# Patient Record
Sex: FEMALE | Race: WHITE | NOT HISPANIC OR LATINO | Employment: UNEMPLOYED | URBAN - NONMETROPOLITAN AREA
[De-identification: names, ages, dates, MRNs, and addresses within clinical notes are randomized per-mention and may not be internally consistent; named-entity substitution may affect disease eponyms.]

---

## 2020-01-16 ENCOUNTER — APPOINTMENT (OUTPATIENT)
Dept: GENERAL RADIOLOGY | Facility: HOSPITAL | Age: 50
End: 2020-01-16

## 2020-01-16 ENCOUNTER — HOSPITAL ENCOUNTER (EMERGENCY)
Facility: HOSPITAL | Age: 50
Discharge: HOME | End: 2020-01-16
Attending: EMERGENCY MEDICINE | Admitting: EMERGENCY MEDICINE

## 2020-01-16 VITALS
WEIGHT: 250 LBS | OXYGEN SATURATION: 93 % | BODY MASS INDEX: 46.01 KG/M2 | RESPIRATION RATE: 20 BRPM | HEIGHT: 62 IN | TEMPERATURE: 98.1 F | SYSTOLIC BLOOD PRESSURE: 180 MMHG | DIASTOLIC BLOOD PRESSURE: 100 MMHG | HEART RATE: 87 BPM

## 2020-01-16 DIAGNOSIS — R06.00 DYSPNEA, UNSPECIFIED TYPE: Primary | ICD-10-CM

## 2020-01-16 LAB
A-A DO2: 44.9 MMHG (ref 0–300)
ALBUMIN SERPL-MCNC: 4.27 G/DL (ref 3.5–5.2)
ALBUMIN/GLOB SERPL: 1.3 G/DL
ALP SERPL-CCNC: 89 U/L (ref 39–117)
ALT SERPL W P-5'-P-CCNC: 10 U/L (ref 1–33)
ANION GAP SERPL CALCULATED.3IONS-SCNC: 11.6 MMOL/L (ref 5–15)
ARTERIAL PATENCY WRIST A: ABNORMAL
AST SERPL-CCNC: 22 U/L (ref 1–32)
ATMOSPHERIC PRESS: 740 MMHG
BASE EXCESS BLDA CALC-SCNC: 0.2 MMOL/L (ref 0–2)
BASOPHILS # BLD AUTO: 0.09 10*3/MM3 (ref 0–0.2)
BASOPHILS NFR BLD AUTO: 1 % (ref 0–1.5)
BDY SITE: ABNORMAL
BILIRUB SERPL-MCNC: 0.3 MG/DL (ref 0.2–1.2)
BODY TEMPERATURE: 0 C
BUN BLD-MCNC: 9 MG/DL (ref 6–20)
BUN/CREAT SERPL: 12.5 (ref 7–25)
CALCIUM SPEC-SCNC: 9.4 MG/DL (ref 8.6–10.5)
CHLORIDE SERPL-SCNC: 103 MMOL/L (ref 98–107)
CO2 BLDA-SCNC: 24 MMOL/L (ref 22–33)
CO2 SERPL-SCNC: 24.4 MMOL/L (ref 22–29)
COHGB MFR BLD: 0.7 % (ref 0–5)
CREAT BLD-MCNC: 0.72 MG/DL (ref 0.57–1)
CRP SERPL-MCNC: 0.2 MG/DL (ref 0–0.5)
D DIMER PPP FEU-MCNC: 0.39 MCGFEU/ML (ref 0–0.5)
D-LACTATE SERPL-SCNC: 1.1 MMOL/L (ref 0.5–2)
DEPRECATED RDW RBC AUTO: 46.8 FL (ref 37–54)
EOSINOPHIL # BLD AUTO: 0.95 10*3/MM3 (ref 0–0.4)
EOSINOPHIL NFR BLD AUTO: 10 % (ref 0.3–6.2)
ERYTHROCYTE [DISTWIDTH] IN BLOOD BY AUTOMATED COUNT: 14.4 % (ref 12.3–15.4)
FLUAV AG NPH QL: NEGATIVE
FLUBV AG NPH QL IA: NEGATIVE
GFR SERPL CREATININE-BSD FRML MDRD: 85 ML/MIN/1.73
GLOBULIN UR ELPH-MCNC: 3.2 GM/DL
GLUCOSE BLD-MCNC: 109 MG/DL (ref 65–99)
HCO3 BLDA-SCNC: 23 MMOL/L (ref 20–26)
HCT VFR BLD AUTO: 42.4 % (ref 34–46.6)
HCT VFR BLD CALC: 43.3 % (ref 38–51)
HGB BLD-MCNC: 13.6 G/DL (ref 12–15.9)
HGB BLDA-MCNC: 14.1 G/DL (ref 13.5–17.5)
HOROWITZ INDEX BLD+IHG-RTO: 21 %
IMM GRANULOCYTES # BLD AUTO: 0.05 10*3/MM3 (ref 0–0.05)
IMM GRANULOCYTES NFR BLD AUTO: 0.5 % (ref 0–0.5)
LYMPHOCYTES # BLD AUTO: 2.46 10*3/MM3 (ref 0.7–3.1)
LYMPHOCYTES NFR BLD AUTO: 26 % (ref 19.6–45.3)
Lab: ABNORMAL
MCH RBC QN AUTO: 28.6 PG (ref 26.6–33)
MCHC RBC AUTO-ENTMCNC: 32.1 G/DL (ref 31.5–35.7)
MCV RBC AUTO: 89.3 FL (ref 79–97)
METHGB BLD QL: 0.5 % (ref 0–3)
MODALITY: ABNORMAL
MONOCYTES # BLD AUTO: 0.69 10*3/MM3 (ref 0.1–0.9)
MONOCYTES NFR BLD AUTO: 7.3 % (ref 5–12)
NEUTROPHILS # BLD AUTO: 5.23 10*3/MM3 (ref 1.7–7)
NEUTROPHILS NFR BLD AUTO: 55.2 % (ref 42.7–76)
NOTE: ABNORMAL
NRBC BLD AUTO-RTO: 0 /100 WBC (ref 0–0.2)
OXYHGB MFR BLDV: 93.2 % (ref 94–99)
PCO2 BLDA: 31.5 MM HG (ref 35–45)
PCO2 TEMP ADJ BLD: ABNORMAL MM[HG]
PH BLDA: 7.47 PH UNITS (ref 7.35–7.45)
PH, TEMP CORRECTED: ABNORMAL
PLATELET # BLD AUTO: 390 10*3/MM3 (ref 140–450)
PMV BLD AUTO: 9.6 FL (ref 6–12)
PO2 BLDA: 65.2 MM HG (ref 83–108)
PO2 TEMP ADJ BLD: ABNORMAL MM[HG]
POTASSIUM BLD-SCNC: 4 MMOL/L (ref 3.5–5.2)
PROT SERPL-MCNC: 7.5 G/DL (ref 6–8.5)
RBC # BLD AUTO: 4.75 10*6/MM3 (ref 3.77–5.28)
SAO2 % BLDCOA: 94.3 % (ref 94–99)
SODIUM BLD-SCNC: 139 MMOL/L (ref 136–145)
TROPONIN T SERPL-MCNC: <0.01 NG/ML (ref 0–0.03)
VENTILATOR MODE: ABNORMAL
WBC NRBC COR # BLD: 9.47 10*3/MM3 (ref 3.4–10.8)

## 2020-01-16 PROCEDURE — 83050 HGB METHEMOGLOBIN QUAN: CPT

## 2020-01-16 PROCEDURE — 71045 X-RAY EXAM CHEST 1 VIEW: CPT | Performed by: RADIOLOGY

## 2020-01-16 PROCEDURE — 82805 BLOOD GASES W/O2 SATURATION: CPT

## 2020-01-16 PROCEDURE — 87804 INFLUENZA ASSAY W/OPTIC: CPT | Performed by: NURSE PRACTITIONER

## 2020-01-16 PROCEDURE — 84484 ASSAY OF TROPONIN QUANT: CPT | Performed by: NURSE PRACTITIONER

## 2020-01-16 PROCEDURE — 94799 UNLISTED PULMONARY SVC/PX: CPT

## 2020-01-16 PROCEDURE — 99284 EMERGENCY DEPT VISIT MOD MDM: CPT

## 2020-01-16 PROCEDURE — 25010000002 METHYLPREDNISOLONE PER 125 MG: Performed by: NURSE PRACTITIONER

## 2020-01-16 PROCEDURE — 96374 THER/PROPH/DIAG INJ IV PUSH: CPT

## 2020-01-16 PROCEDURE — 93005 ELECTROCARDIOGRAM TRACING: CPT | Performed by: NURSE PRACTITIONER

## 2020-01-16 PROCEDURE — 86140 C-REACTIVE PROTEIN: CPT | Performed by: NURSE PRACTITIONER

## 2020-01-16 PROCEDURE — 36600 WITHDRAWAL OF ARTERIAL BLOOD: CPT

## 2020-01-16 PROCEDURE — 80053 COMPREHEN METABOLIC PANEL: CPT | Performed by: NURSE PRACTITIONER

## 2020-01-16 PROCEDURE — 85379 FIBRIN DEGRADATION QUANT: CPT | Performed by: NURSE PRACTITIONER

## 2020-01-16 PROCEDURE — 94640 AIRWAY INHALATION TREATMENT: CPT

## 2020-01-16 PROCEDURE — 71045 X-RAY EXAM CHEST 1 VIEW: CPT

## 2020-01-16 PROCEDURE — 82375 ASSAY CARBOXYHB QUANT: CPT

## 2020-01-16 PROCEDURE — 87040 BLOOD CULTURE FOR BACTERIA: CPT | Performed by: NURSE PRACTITIONER

## 2020-01-16 PROCEDURE — 83605 ASSAY OF LACTIC ACID: CPT | Performed by: NURSE PRACTITIONER

## 2020-01-16 PROCEDURE — 93010 ELECTROCARDIOGRAM REPORT: CPT | Performed by: INTERNAL MEDICINE

## 2020-01-16 PROCEDURE — 85025 COMPLETE CBC W/AUTO DIFF WBC: CPT | Performed by: NURSE PRACTITIONER

## 2020-01-16 RX ORDER — METHYLPREDNISOLONE SODIUM SUCCINATE 125 MG/2ML
125 INJECTION, POWDER, LYOPHILIZED, FOR SOLUTION INTRAMUSCULAR; INTRAVENOUS ONCE
Status: COMPLETED | OUTPATIENT
Start: 2020-01-16 | End: 2020-01-16

## 2020-01-16 RX ORDER — CETIRIZINE HYDROCHLORIDE, PSEUDOEPHEDRINE HYDROCHLORIDE 5; 120 MG/1; MG/1
1 TABLET, FILM COATED, EXTENDED RELEASE ORAL DAILY
Status: ON HOLD | COMMUNITY
End: 2022-08-16

## 2020-01-16 RX ORDER — SODIUM CHLORIDE 0.9 % (FLUSH) 0.9 %
10 SYRINGE (ML) INJECTION AS NEEDED
Status: DISCONTINUED | OUTPATIENT
Start: 2020-01-16 | End: 2020-01-16 | Stop reason: HOSPADM

## 2020-01-16 RX ORDER — IPRATROPIUM BROMIDE AND ALBUTEROL SULFATE 2.5; .5 MG/3ML; MG/3ML
3 SOLUTION RESPIRATORY (INHALATION) ONCE
Status: COMPLETED | OUTPATIENT
Start: 2020-01-16 | End: 2020-01-16

## 2020-01-16 RX ORDER — FUROSEMIDE 40 MG/1
40 TABLET ORAL DAILY
Qty: 5 TABLET | Refills: 0 | Status: SHIPPED | OUTPATIENT
Start: 2020-01-16 | End: 2020-01-21

## 2020-01-16 RX ORDER — CLONIDINE HYDROCHLORIDE 0.1 MG/1
0.1 TABLET ORAL ONCE
Status: COMPLETED | OUTPATIENT
Start: 2020-01-16 | End: 2020-01-16

## 2020-01-16 RX ADMIN — CLONIDINE HYDROCHLORIDE 0.1 MG: 0.1 TABLET ORAL at 20:35

## 2020-01-16 RX ADMIN — SODIUM CHLORIDE, PRESERVATIVE FREE 10 ML: 5 INJECTION INTRAVENOUS at 18:43

## 2020-01-16 RX ADMIN — METHYLPREDNISOLONE SODIUM SUCCINATE 125 MG: 125 INJECTION, POWDER, FOR SOLUTION INTRAMUSCULAR; INTRAVENOUS at 18:43

## 2020-01-16 RX ADMIN — IPRATROPIUM BROMIDE AND ALBUTEROL SULFATE 3 ML: .5; 3 SOLUTION RESPIRATORY (INHALATION) at 18:12

## 2020-01-16 RX ADMIN — CLONIDINE HYDROCHLORIDE 0.1 MG: 0.1 TABLET ORAL at 18:45

## 2020-01-16 NOTE — ED PROVIDER NOTES
Subjective     History provided by:  Patient   used: No    Shortness of Breath   Severity:  Moderate  Onset quality:  Sudden  Duration:  1 day  Timing:  Constant  Progression:  Worsening  Chronicity:  Recurrent  Context: URI    Context: not animal exposure, not emotional upset, not occupational exposure, not pollens and not strong odors    Relieved by:  Nothing  Worsened by:  Nothing  Ineffective treatments:  None tried  Associated symptoms: sputum production and wheezing    Associated symptoms: no abdominal pain and no chest pain    Risk factors: no recent alcohol use, no family hx of DVT, no hx of PE/DVT, no obesity, no prolonged immobilization and no recent surgery        Review of Systems   Constitutional: Negative.    HENT: Negative.    Eyes: Negative.    Respiratory: Positive for sputum production, shortness of breath and wheezing.    Cardiovascular: Negative.  Negative for chest pain.   Gastrointestinal: Negative.  Negative for abdominal pain.   Endocrine: Negative.    Genitourinary: Negative.    Musculoskeletal: Negative.    Skin: Negative.    Allergic/Immunologic: Negative.    Neurological: Negative.    Hematological: Negative.    Psychiatric/Behavioral: Negative.        History reviewed. No pertinent past medical history.    No Known Allergies    History reviewed. No pertinent surgical history.    History reviewed. No pertinent family history.    Social History     Socioeconomic History   • Marital status:      Spouse name: Not on file   • Number of children: Not on file   • Years of education: Not on file   • Highest education level: Not on file   Tobacco Use   • Smoking status: Former Smoker     Last attempt to quit: 2019     Years since quittin.0   • Smokeless tobacco: Never Used   Substance and Sexual Activity   • Alcohol use: Not Currently   • Drug use: Never   • Sexual activity: Defer           Objective   Physical Exam   Constitutional: She is oriented to person,  place, and time. She appears well-developed and well-nourished.   HENT:   Head: Normocephalic.   Right Ear: External ear normal.   Left Ear: External ear normal.   Mouth/Throat: Oropharynx is clear and moist.   Eyes: Pupils are equal, round, and reactive to light. Conjunctivae and EOM are normal.   Neck: Normal range of motion. Neck supple.   Cardiovascular: Normal rate, regular rhythm, normal heart sounds and intact distal pulses.   Pulmonary/Chest: Effort normal. She has wheezes.   Abdominal: Soft. Bowel sounds are normal.   Musculoskeletal: Normal range of motion.   Neurological: She is alert and oriented to person, place, and time.   Skin: Skin is warm and dry. Capillary refill takes less than 2 seconds.   Psychiatric: She has a normal mood and affect. Her behavior is normal. Thought content normal.   Nursing note and vitals reviewed.      Procedures           ED Course  ED Course as of Jan 19 2340   Thu Jan 16, 2020 2001 Evaluated at bedside, and agree with assessment and plan.  Patient will follow-up with her primary care physician this coming Tuesday already scheduled.  Return to the emergency department with any worsening symptoms.  At bedside, she denies any shortness of breath.  SANDRA ROA    [ES]      ED Course User Index  [ES] Dg Charles MD                                               [facility]    Final diagnoses:   Dyspnea, unspecified type            Eduardo Kaur, APRN  01/19/20 1664

## 2020-01-17 NOTE — DISCHARGE INSTRUCTIONS
Follow up with primary care provider Tuesday as scheduled.     Return to the emergency room for worsening symptoms.

## 2020-01-21 LAB
BACTERIA SPEC AEROBE CULT: NORMAL
BACTERIA SPEC AEROBE CULT: NORMAL

## 2021-03-18 ENCOUNTER — BULK ORDERING (OUTPATIENT)
Dept: CASE MANAGEMENT | Facility: OTHER | Age: 51
End: 2021-03-18

## 2021-03-18 DIAGNOSIS — Z23 IMMUNIZATION DUE: ICD-10-CM

## 2021-10-25 ENCOUNTER — APPOINTMENT (OUTPATIENT)
Dept: BONE DENSITY | Facility: HOSPITAL | Age: 51
End: 2021-10-25

## 2022-03-23 ENCOUNTER — HOSPITAL ENCOUNTER (OUTPATIENT)
Dept: GENERAL RADIOLOGY | Facility: HOSPITAL | Age: 52
Discharge: HOME | End: 2022-03-23
Admitting: PHYSICIAN ASSISTANT

## 2022-03-23 ENCOUNTER — TRANSCRIBE ORDERS (OUTPATIENT)
Dept: OTHER | Facility: OTHER | Age: 52
End: 2022-03-23

## 2022-03-23 DIAGNOSIS — M25.569 KNEE PAIN, UNSPECIFIED CHRONICITY, UNSPECIFIED LATERALITY: Primary | ICD-10-CM

## 2022-03-23 DIAGNOSIS — M25.569 KNEE PAIN, UNSPECIFIED CHRONICITY, UNSPECIFIED LATERALITY: ICD-10-CM

## 2022-03-23 PROCEDURE — 73562 X-RAY EXAM OF KNEE 3: CPT

## 2022-03-23 PROCEDURE — 73562 X-RAY EXAM OF KNEE 3: CPT | Performed by: RADIOLOGY

## 2022-03-30 ENCOUNTER — TELEPHONE (OUTPATIENT)
Dept: ORTHOPEDIC SURGERY | Facility: CLINIC | Age: 52
End: 2022-03-30

## 2022-03-30 NOTE — TELEPHONE ENCOUNTER
Called to check on patient since she missed her appt today, home number asked for a remote access code and cell has a mailbox that hasn't been set up yet. Unable to leave message.

## 2022-08-15 ENCOUNTER — HOSPITAL ENCOUNTER (OUTPATIENT)
Facility: HOSPITAL | Age: 52
Setting detail: OBSERVATION
Discharge: HOME | End: 2022-08-18
Attending: EMERGENCY MEDICINE | Admitting: HOSPITALIST

## 2022-08-15 ENCOUNTER — APPOINTMENT (OUTPATIENT)
Dept: GENERAL RADIOLOGY | Facility: HOSPITAL | Age: 52
End: 2022-08-15

## 2022-08-15 DIAGNOSIS — R09.02 HYPOXIA: ICD-10-CM

## 2022-08-15 DIAGNOSIS — U07.1 COVID-19: Primary | ICD-10-CM

## 2022-08-15 LAB
A-A DO2: 91 MMHG (ref 0–300)
ALBUMIN SERPL-MCNC: 4.43 G/DL (ref 3.5–5.2)
ALBUMIN/GLOB SERPL: 1.7 G/DL
ALP SERPL-CCNC: 90 U/L (ref 39–117)
ALT SERPL W P-5'-P-CCNC: 19 U/L (ref 1–33)
ANION GAP SERPL CALCULATED.3IONS-SCNC: 12.2 MMOL/L (ref 5–15)
ARTERIAL PATENCY WRIST A: POSITIVE
AST SERPL-CCNC: 18 U/L (ref 1–32)
ATMOSPHERIC PRESS: 726 MMHG
BASE EXCESS BLDA CALC-SCNC: -0.6 MMOL/L (ref 0–2)
BASOPHILS # BLD AUTO: 0.04 10*3/MM3 (ref 0–0.2)
BASOPHILS NFR BLD AUTO: 0.7 % (ref 0–1.5)
BDY SITE: ABNORMAL
BILIRUB SERPL-MCNC: <0.2 MG/DL (ref 0–1.2)
BODY TEMPERATURE: 0 C
BUN SERPL-MCNC: 17 MG/DL (ref 6–20)
BUN/CREAT SERPL: 20.5 (ref 7–25)
CALCIUM SPEC-SCNC: 9.2 MG/DL (ref 8.6–10.5)
CHLORIDE SERPL-SCNC: 105 MMOL/L (ref 98–107)
CO2 BLDA-SCNC: 24.4 MMOL/L (ref 22–33)
CO2 SERPL-SCNC: 23.8 MMOL/L (ref 22–29)
COHGB MFR BLD: 2.2 % (ref 0–5)
CREAT SERPL-MCNC: 0.83 MG/DL (ref 0.57–1)
CRP SERPL-MCNC: 0.54 MG/DL (ref 0–0.5)
D-LACTATE SERPL-SCNC: 1.7 MMOL/L (ref 0.5–2)
DEPRECATED RDW RBC AUTO: 45.4 FL (ref 37–54)
EGFRCR SERPLBLD CKD-EPI 2021: 83.9 ML/MIN/1.73
EOSINOPHIL # BLD AUTO: 0.13 10*3/MM3 (ref 0–0.4)
EOSINOPHIL NFR BLD AUTO: 2.2 % (ref 0.3–6.2)
ERYTHROCYTE [DISTWIDTH] IN BLOOD BY AUTOMATED COUNT: 14 % (ref 12.3–15.4)
FLUAV RNA RESP QL NAA+PROBE: NOT DETECTED
FLUBV RNA RESP QL NAA+PROBE: NOT DETECTED
GAS FLOW AIRWAY: 2 LPM
GLOBULIN UR ELPH-MCNC: 2.7 GM/DL
GLUCOSE SERPL-MCNC: 115 MG/DL (ref 65–99)
HCO3 BLDA-SCNC: 23.3 MMOL/L (ref 20–26)
HCT VFR BLD AUTO: 40.6 % (ref 34–46.6)
HCT VFR BLD CALC: 41.6 % (ref 38–51)
HGB BLD-MCNC: 13.4 G/DL (ref 12–15.9)
HGB BLDA-MCNC: 13.6 G/DL (ref 13.5–17.5)
HOLD SPECIMEN: NORMAL
HOLD SPECIMEN: NORMAL
IMM GRANULOCYTES # BLD AUTO: 0.02 10*3/MM3 (ref 0–0.05)
IMM GRANULOCYTES NFR BLD AUTO: 0.3 % (ref 0–0.5)
INHALED O2 CONCENTRATION: 28 %
LYMPHOCYTES # BLD AUTO: 0.56 10*3/MM3 (ref 0.7–3.1)
LYMPHOCYTES NFR BLD AUTO: 9.4 % (ref 19.6–45.3)
Lab: ABNORMAL
MAGNESIUM SERPL-MCNC: 2.1 MG/DL (ref 1.6–2.6)
MCH RBC QN AUTO: 29.3 PG (ref 26.6–33)
MCHC RBC AUTO-ENTMCNC: 33 G/DL (ref 31.5–35.7)
MCV RBC AUTO: 88.6 FL (ref 79–97)
METHGB BLD QL: 0.4 % (ref 0–3)
MODALITY: ABNORMAL
MONOCYTES # BLD AUTO: 0.85 10*3/MM3 (ref 0.1–0.9)
MONOCYTES NFR BLD AUTO: 14.3 % (ref 5–12)
NEUTROPHILS NFR BLD AUTO: 4.36 10*3/MM3 (ref 1.7–7)
NEUTROPHILS NFR BLD AUTO: 73.1 % (ref 42.7–76)
NOTE: ABNORMAL
NOTIFIED BY: ABNORMAL
NOTIFIED WHO: ABNORMAL
NRBC BLD AUTO-RTO: 0 /100 WBC (ref 0–0.2)
OXYHGB MFR BLDV: 89.6 % (ref 94–99)
PCO2 BLDA: 35.5 MM HG (ref 35–45)
PCO2 TEMP ADJ BLD: ABNORMAL MM[HG]
PH BLDA: 7.43 PH UNITS (ref 7.35–7.45)
PH, TEMP CORRECTED: ABNORMAL
PLATELET # BLD AUTO: 337 10*3/MM3 (ref 140–450)
PMV BLD AUTO: 9.8 FL (ref 6–12)
PO2 BLDA: 59.4 MM HG (ref 83–108)
PO2 TEMP ADJ BLD: ABNORMAL MM[HG]
POTASSIUM SERPL-SCNC: 4.1 MMOL/L (ref 3.5–5.2)
PROT SERPL-MCNC: 7.1 G/DL (ref 6–8.5)
RBC # BLD AUTO: 4.58 10*6/MM3 (ref 3.77–5.28)
SAO2 % BLDCOA: 92 % (ref 94–99)
SARS-COV-2 RNA RESP QL NAA+PROBE: DETECTED
SODIUM SERPL-SCNC: 141 MMOL/L (ref 136–145)
TSH SERPL DL<=0.05 MIU/L-ACNC: 0.77 UIU/ML (ref 0.27–4.2)
VENTILATOR MODE: ABNORMAL
WBC NRBC COR # BLD: 5.96 10*3/MM3 (ref 3.4–10.8)
WHOLE BLOOD HOLD COAG: NORMAL
WHOLE BLOOD HOLD SPECIMEN: NORMAL

## 2022-08-15 PROCEDURE — 80053 COMPREHEN METABOLIC PANEL: CPT | Performed by: EMERGENCY MEDICINE

## 2022-08-15 PROCEDURE — 82728 ASSAY OF FERRITIN: CPT | Performed by: HOSPITALIST

## 2022-08-15 PROCEDURE — 25010000002 DEXAMETHASONE SODIUM PHOSPHATE 10 MG/ML SOLUTION: Performed by: EMERGENCY MEDICINE

## 2022-08-15 PROCEDURE — 85379 FIBRIN DEGRADATION QUANT: CPT | Performed by: EMERGENCY MEDICINE

## 2022-08-15 PROCEDURE — 84484 ASSAY OF TROPONIN QUANT: CPT | Performed by: EMERGENCY MEDICINE

## 2022-08-15 PROCEDURE — 36600 WITHDRAWAL OF ARTERIAL BLOOD: CPT

## 2022-08-15 PROCEDURE — 99284 EMERGENCY DEPT VISIT MOD MDM: CPT

## 2022-08-15 PROCEDURE — 84145 PROCALCITONIN (PCT): CPT | Performed by: HOSPITALIST

## 2022-08-15 PROCEDURE — G0378 HOSPITAL OBSERVATION PER HR: HCPCS

## 2022-08-15 PROCEDURE — 85025 COMPLETE CBC W/AUTO DIFF WBC: CPT | Performed by: EMERGENCY MEDICINE

## 2022-08-15 PROCEDURE — 87636 SARSCOV2 & INF A&B AMP PRB: CPT | Performed by: EMERGENCY MEDICINE

## 2022-08-15 PROCEDURE — 83605 ASSAY OF LACTIC ACID: CPT | Performed by: EMERGENCY MEDICINE

## 2022-08-15 PROCEDURE — 96375 TX/PRO/DX INJ NEW DRUG ADDON: CPT

## 2022-08-15 PROCEDURE — 83050 HGB METHEMOGLOBIN QUAN: CPT

## 2022-08-15 PROCEDURE — C9803 HOPD COVID-19 SPEC COLLECT: HCPCS

## 2022-08-15 PROCEDURE — 83615 LACTATE (LD) (LDH) ENZYME: CPT | Performed by: HOSPITALIST

## 2022-08-15 PROCEDURE — 86140 C-REACTIVE PROTEIN: CPT | Performed by: EMERGENCY MEDICINE

## 2022-08-15 PROCEDURE — 82375 ASSAY CARBOXYHB QUANT: CPT

## 2022-08-15 PROCEDURE — 84443 ASSAY THYROID STIM HORMONE: CPT | Performed by: EMERGENCY MEDICINE

## 2022-08-15 PROCEDURE — 93005 ELECTROCARDIOGRAM TRACING: CPT | Performed by: EMERGENCY MEDICINE

## 2022-08-15 PROCEDURE — 36415 COLL VENOUS BLD VENIPUNCTURE: CPT

## 2022-08-15 PROCEDURE — 83880 ASSAY OF NATRIURETIC PEPTIDE: CPT | Performed by: EMERGENCY MEDICINE

## 2022-08-15 PROCEDURE — 93010 ELECTROCARDIOGRAM REPORT: CPT | Performed by: INTERNAL MEDICINE

## 2022-08-15 PROCEDURE — 87040 BLOOD CULTURE FOR BACTERIA: CPT | Performed by: EMERGENCY MEDICINE

## 2022-08-15 PROCEDURE — 71045 X-RAY EXAM CHEST 1 VIEW: CPT

## 2022-08-15 PROCEDURE — 83735 ASSAY OF MAGNESIUM: CPT | Performed by: EMERGENCY MEDICINE

## 2022-08-15 PROCEDURE — 82805 BLOOD GASES W/O2 SATURATION: CPT

## 2022-08-15 PROCEDURE — 85610 PROTHROMBIN TIME: CPT | Performed by: HOSPITALIST

## 2022-08-15 RX ORDER — KETOROLAC TROMETHAMINE 30 MG/ML
30 INJECTION, SOLUTION INTRAMUSCULAR; INTRAVENOUS ONCE
Status: COMPLETED | OUTPATIENT
Start: 2022-08-15 | End: 2022-08-16

## 2022-08-15 RX ORDER — SODIUM CHLORIDE 9 MG/ML
125 INJECTION, SOLUTION INTRAVENOUS CONTINUOUS
Status: DISCONTINUED | OUTPATIENT
Start: 2022-08-15 | End: 2022-08-16

## 2022-08-15 RX ORDER — DEXAMETHASONE SODIUM PHOSPHATE 10 MG/ML
10 INJECTION, SOLUTION INTRAMUSCULAR; INTRAVENOUS ONCE
Status: COMPLETED | OUTPATIENT
Start: 2022-08-15 | End: 2022-08-15

## 2022-08-15 RX ORDER — ALBUTEROL SULFATE 90 UG/1
2 AEROSOL, METERED RESPIRATORY (INHALATION)
Status: COMPLETED | OUTPATIENT
Start: 2022-08-15 | End: 2022-08-15

## 2022-08-15 RX ORDER — SODIUM CHLORIDE 0.9 % (FLUSH) 0.9 %
10 SYRINGE (ML) INJECTION AS NEEDED
Status: DISCONTINUED | OUTPATIENT
Start: 2022-08-15 | End: 2022-08-18 | Stop reason: HOSPADM

## 2022-08-15 RX ADMIN — ALBUTEROL SULFATE 2 PUFF: 90 AEROSOL, METERED RESPIRATORY (INHALATION) at 22:33

## 2022-08-15 RX ADMIN — DEXAMETHASONE SODIUM PHOSPHATE 10 MG: 10 INJECTION INTRAMUSCULAR; INTRAVENOUS at 22:31

## 2022-08-15 RX ADMIN — ALBUTEROL SULFATE 2 PUFF: 90 AEROSOL, METERED RESPIRATORY (INHALATION) at 22:55

## 2022-08-15 RX ADMIN — SODIUM CHLORIDE 1000 ML: 9 INJECTION, SOLUTION INTRAVENOUS at 22:28

## 2022-08-15 RX ADMIN — ALBUTEROL SULFATE 2 PUFF: 90 AEROSOL, METERED RESPIRATORY (INHALATION) at 22:45

## 2022-08-16 ENCOUNTER — APPOINTMENT (OUTPATIENT)
Dept: CT IMAGING | Facility: HOSPITAL | Age: 52
End: 2022-08-16

## 2022-08-16 PROBLEM — J12.82 PNEUMONIA DUE TO COVID-19 VIRUS: Status: ACTIVE | Noted: 2022-08-16

## 2022-08-16 PROBLEM — U07.1 PNEUMONIA DUE TO COVID-19 VIRUS: Status: ACTIVE | Noted: 2022-08-16

## 2022-08-16 LAB
ALBUMIN SERPL-MCNC: 4.61 G/DL (ref 3.5–5.2)
ALBUMIN/GLOB SERPL: 1.5 G/DL
ALP SERPL-CCNC: 93 U/L (ref 39–117)
ALT SERPL W P-5'-P-CCNC: 21 U/L (ref 1–33)
ANION GAP SERPL CALCULATED.3IONS-SCNC: 13.9 MMOL/L (ref 5–15)
APTT PPP: 35.3 SECONDS (ref 26.5–34.5)
AST SERPL-CCNC: 20 U/L (ref 1–32)
BASOPHILS # BLD AUTO: 0.02 10*3/MM3 (ref 0–0.2)
BASOPHILS NFR BLD AUTO: 0.4 % (ref 0–1.5)
BILIRUB CONJ SERPL-MCNC: <0.2 MG/DL (ref 0–0.3)
BILIRUB SERPL-MCNC: 0.2 MG/DL (ref 0–1.2)
BILIRUB UR QL STRIP: NEGATIVE
BUN SERPL-MCNC: 14 MG/DL (ref 6–20)
BUN/CREAT SERPL: 16.9 (ref 7–25)
CALCIUM SPEC-SCNC: 9.2 MG/DL (ref 8.6–10.5)
CHLORIDE SERPL-SCNC: 105 MMOL/L (ref 98–107)
CK SERPL-CCNC: 80 U/L (ref 20–180)
CLARITY UR: CLEAR
CO2 SERPL-SCNC: 23.1 MMOL/L (ref 22–29)
COLOR UR: YELLOW
CREAT SERPL-MCNC: 0.83 MG/DL (ref 0.57–1)
CRP SERPL-MCNC: 0.84 MG/DL (ref 0–0.5)
D DIMER PPP FEU-MCNC: 0.31 MCGFEU/ML (ref 0–0.5)
DEPRECATED RDW RBC AUTO: 46.2 FL (ref 37–54)
EGFRCR SERPLBLD CKD-EPI 2021: 83.9 ML/MIN/1.73
EOSINOPHIL # BLD AUTO: 0 10*3/MM3 (ref 0–0.4)
EOSINOPHIL NFR BLD AUTO: 0 % (ref 0.3–6.2)
ERYTHROCYTE [DISTWIDTH] IN BLOOD BY AUTOMATED COUNT: 13.9 % (ref 12.3–15.4)
FERRITIN SERPL-MCNC: 43.91 NG/ML (ref 13–150)
FERRITIN SERPL-MCNC: 50.3 NG/ML (ref 13–150)
GLOBULIN UR ELPH-MCNC: 3 GM/DL
GLUCOSE BLDC GLUCOMTR-MCNC: 118 MG/DL (ref 70–130)
GLUCOSE BLDC GLUCOMTR-MCNC: 135 MG/DL (ref 70–130)
GLUCOSE BLDC GLUCOMTR-MCNC: 176 MG/DL (ref 70–130)
GLUCOSE BLDC GLUCOMTR-MCNC: 188 MG/DL (ref 70–130)
GLUCOSE SERPL-MCNC: 190 MG/DL (ref 65–99)
GLUCOSE UR STRIP-MCNC: NEGATIVE MG/DL
HBA1C MFR BLD: 5.7 % (ref 4.8–5.6)
HCT VFR BLD AUTO: 42.2 % (ref 34–46.6)
HGB BLD-MCNC: 13.5 G/DL (ref 12–15.9)
HGB UR QL STRIP.AUTO: NEGATIVE
IMM GRANULOCYTES # BLD AUTO: 0.03 10*3/MM3 (ref 0–0.05)
IMM GRANULOCYTES NFR BLD AUTO: 0.6 % (ref 0–0.5)
INR PPP: 0.95 (ref 0.9–1.1)
KETONES UR QL STRIP: NEGATIVE
LDH SERPL-CCNC: 162 U/L (ref 135–214)
LEUKOCYTE ESTERASE UR QL STRIP.AUTO: NEGATIVE
LYMPHOCYTES # BLD AUTO: 0.38 10*3/MM3 (ref 0.7–3.1)
LYMPHOCYTES NFR BLD AUTO: 7.4 % (ref 19.6–45.3)
MCH RBC QN AUTO: 28.9 PG (ref 26.6–33)
MCHC RBC AUTO-ENTMCNC: 32 G/DL (ref 31.5–35.7)
MCV RBC AUTO: 90.4 FL (ref 79–97)
MONOCYTES # BLD AUTO: 0.23 10*3/MM3 (ref 0.1–0.9)
MONOCYTES NFR BLD AUTO: 4.5 % (ref 5–12)
NEUTROPHILS NFR BLD AUTO: 4.46 10*3/MM3 (ref 1.7–7)
NEUTROPHILS NFR BLD AUTO: 87.1 % (ref 42.7–76)
NITRITE UR QL STRIP: NEGATIVE
NRBC BLD AUTO-RTO: 0 /100 WBC (ref 0–0.2)
NT-PROBNP SERPL-MCNC: 21.5 PG/ML (ref 0–900)
PH UR STRIP.AUTO: 6.5 [PH] (ref 5–8)
PLATELET # BLD AUTO: 343 10*3/MM3 (ref 140–450)
PMV BLD AUTO: 9.7 FL (ref 6–12)
POTASSIUM SERPL-SCNC: 4.3 MMOL/L (ref 3.5–5.2)
PROCALCITONIN SERPL-MCNC: 0.06 NG/ML (ref 0–0.25)
PROT SERPL-MCNC: 7.6 G/DL (ref 6–8.5)
PROT UR QL STRIP: NEGATIVE
PROTHROMBIN TIME: 12.9 SECONDS (ref 12.1–14.7)
QT INTERVAL: 308 MS
QT INTERVAL: 312 MS
QTC INTERVAL: 430 MS
QTC INTERVAL: 433 MS
RBC # BLD AUTO: 4.67 10*6/MM3 (ref 3.77–5.28)
SODIUM SERPL-SCNC: 142 MMOL/L (ref 136–145)
SP GR UR STRIP: 1.03 (ref 1–1.03)
TROPONIN T SERPL-MCNC: <0.01 NG/ML (ref 0–0.03)
UROBILINOGEN UR QL STRIP: NORMAL
WBC NRBC COR # BLD: 5.12 10*3/MM3 (ref 3.4–10.8)

## 2022-08-16 PROCEDURE — 82728 ASSAY OF FERRITIN: CPT | Performed by: HOSPITALIST

## 2022-08-16 PROCEDURE — 25010000002 REMDESIVIR 100 MG RECONSTITUTED SOLUTION: Performed by: HOSPITALIST

## 2022-08-16 PROCEDURE — 86140 C-REACTIVE PROTEIN: CPT | Performed by: HOSPITALIST

## 2022-08-16 PROCEDURE — 82962 GLUCOSE BLOOD TEST: CPT

## 2022-08-16 PROCEDURE — 71250 CT THORAX DX C-: CPT

## 2022-08-16 PROCEDURE — G0378 HOSPITAL OBSERVATION PER HR: HCPCS

## 2022-08-16 PROCEDURE — 81003 URINALYSIS AUTO W/O SCOPE: CPT | Performed by: EMERGENCY MEDICINE

## 2022-08-16 PROCEDURE — 80053 COMPREHEN METABOLIC PANEL: CPT | Performed by: HOSPITALIST

## 2022-08-16 PROCEDURE — 82248 BILIRUBIN DIRECT: CPT | Performed by: HOSPITALIST

## 2022-08-16 PROCEDURE — 25010000002 ENOXAPARIN PER 10 MG: Performed by: HOSPITALIST

## 2022-08-16 PROCEDURE — 94799 UNLISTED PULMONARY SVC/PX: CPT

## 2022-08-16 PROCEDURE — 82550 ASSAY OF CK (CPK): CPT | Performed by: HOSPITALIST

## 2022-08-16 PROCEDURE — 25010000002 KETOROLAC TROMETHAMINE PER 15 MG: Performed by: EMERGENCY MEDICINE

## 2022-08-16 PROCEDURE — 83036 HEMOGLOBIN GLYCOSYLATED A1C: CPT | Performed by: HOSPITALIST

## 2022-08-16 PROCEDURE — 96375 TX/PRO/DX INJ NEW DRUG ADDON: CPT

## 2022-08-16 PROCEDURE — 63710000001 INSULIN ASPART PER 5 UNITS: Performed by: HOSPITALIST

## 2022-08-16 PROCEDURE — 94640 AIRWAY INHALATION TREATMENT: CPT

## 2022-08-16 PROCEDURE — 94664 DEMO&/EVAL PT USE INHALER: CPT

## 2022-08-16 PROCEDURE — 25010000002 DEXAMETHASONE PER 1 MG: Performed by: HOSPITALIST

## 2022-08-16 PROCEDURE — 99220 PR INITIAL OBSERVATION CARE/DAY 70 MINUTES: CPT | Performed by: HOSPITALIST

## 2022-08-16 PROCEDURE — 96361 HYDRATE IV INFUSION ADD-ON: CPT

## 2022-08-16 PROCEDURE — 25010000002 ENOXAPARIN PER 10 MG

## 2022-08-16 PROCEDURE — 85025 COMPLETE CBC W/AUTO DIFF WBC: CPT | Performed by: HOSPITALIST

## 2022-08-16 PROCEDURE — 85730 THROMBOPLASTIN TIME PARTIAL: CPT | Performed by: HOSPITALIST

## 2022-08-16 PROCEDURE — 96376 TX/PRO/DX INJ SAME DRUG ADON: CPT

## 2022-08-16 PROCEDURE — 99203 OFFICE O/P NEW LOW 30 MIN: CPT | Performed by: PHYSICIAN ASSISTANT

## 2022-08-16 PROCEDURE — 96372 THER/PROPH/DIAG INJ SC/IM: CPT

## 2022-08-16 PROCEDURE — 25010000002 FUROSEMIDE PER 20 MG: Performed by: HOSPITALIST

## 2022-08-16 PROCEDURE — 94761 N-INVAS EAR/PLS OXIMETRY MLT: CPT

## 2022-08-16 RX ORDER — CETIRIZINE HYDROCHLORIDE 10 MG/1
10 TABLET ORAL DAILY
Status: CANCELLED | OUTPATIENT
Start: 2022-08-16

## 2022-08-16 RX ORDER — ENOXAPARIN SODIUM 100 MG/ML
40 INJECTION SUBCUTANEOUS DAILY
Status: DISCONTINUED | OUTPATIENT
Start: 2022-08-16 | End: 2022-08-16

## 2022-08-16 RX ORDER — LOSARTAN POTASSIUM 50 MG/1
50 TABLET ORAL
Status: DISCONTINUED | OUTPATIENT
Start: 2022-08-16 | End: 2022-08-18 | Stop reason: HOSPADM

## 2022-08-16 RX ORDER — NICOTINE POLACRILEX 4 MG
15 LOZENGE BUCCAL
Status: DISCONTINUED | OUTPATIENT
Start: 2022-08-16 | End: 2022-08-18 | Stop reason: HOSPADM

## 2022-08-16 RX ORDER — SODIUM CHLORIDE 0.9 % (FLUSH) 0.9 %
10 SYRINGE (ML) INJECTION EVERY 12 HOURS SCHEDULED
Status: DISCONTINUED | OUTPATIENT
Start: 2022-08-16 | End: 2022-08-18 | Stop reason: HOSPADM

## 2022-08-16 RX ORDER — HYDROCHLOROTHIAZIDE 25 MG/1
25 TABLET ORAL DAILY
Status: CANCELLED | OUTPATIENT
Start: 2022-08-16

## 2022-08-16 RX ORDER — CETIRIZINE HYDROCHLORIDE 10 MG/1
10 TABLET ORAL DAILY
COMMUNITY

## 2022-08-16 RX ORDER — INSULIN ASPART 100 [IU]/ML
0-7 INJECTION, SOLUTION INTRAVENOUS; SUBCUTANEOUS
Status: DISCONTINUED | OUTPATIENT
Start: 2022-08-16 | End: 2022-08-18 | Stop reason: HOSPADM

## 2022-08-16 RX ORDER — ACETAMINOPHEN 325 MG/1
650 TABLET ORAL EVERY 6 HOURS PRN
Status: DISCONTINUED | OUTPATIENT
Start: 2022-08-16 | End: 2022-08-18 | Stop reason: HOSPADM

## 2022-08-16 RX ORDER — DEXAMETHASONE SODIUM PHOSPHATE 4 MG/ML
6 INJECTION, SOLUTION INTRA-ARTICULAR; INTRALESIONAL; INTRAMUSCULAR; INTRAVENOUS; SOFT TISSUE DAILY
Status: DISCONTINUED | OUTPATIENT
Start: 2022-08-16 | End: 2022-08-18 | Stop reason: HOSPADM

## 2022-08-16 RX ORDER — BUDESONIDE AND FORMOTEROL FUMARATE DIHYDRATE 160; 4.5 UG/1; UG/1
2 AEROSOL RESPIRATORY (INHALATION)
Status: CANCELLED | OUTPATIENT
Start: 2022-08-16

## 2022-08-16 RX ORDER — BUDESONIDE AND FORMOTEROL FUMARATE DIHYDRATE 160; 4.5 UG/1; UG/1
2 AEROSOL RESPIRATORY (INHALATION)
Status: DISCONTINUED | OUTPATIENT
Start: 2022-08-16 | End: 2022-08-18 | Stop reason: HOSPADM

## 2022-08-16 RX ORDER — PANTOPRAZOLE SODIUM 40 MG/1
40 TABLET, DELAYED RELEASE ORAL
Status: DISCONTINUED | OUTPATIENT
Start: 2022-08-16 | End: 2022-08-18 | Stop reason: HOSPADM

## 2022-08-16 RX ORDER — ALBUTEROL SULFATE 90 UG/1
1-2 AEROSOL, METERED RESPIRATORY (INHALATION) EVERY 4 HOURS PRN
COMMUNITY

## 2022-08-16 RX ORDER — OLMESARTAN MEDOXOMIL 40 MG/1
40 TABLET ORAL DAILY
COMMUNITY

## 2022-08-16 RX ORDER — SODIUM CHLORIDE 0.9 % (FLUSH) 0.9 %
10 SYRINGE (ML) INJECTION AS NEEDED
Status: DISCONTINUED | OUTPATIENT
Start: 2022-08-16 | End: 2022-08-18 | Stop reason: HOSPADM

## 2022-08-16 RX ORDER — FUROSEMIDE 10 MG/ML
20 INJECTION INTRAMUSCULAR; INTRAVENOUS ONCE
Status: COMPLETED | OUTPATIENT
Start: 2022-08-16 | End: 2022-08-16

## 2022-08-16 RX ORDER — AMITRIPTYLINE HYDROCHLORIDE 10 MG/1
10 TABLET, FILM COATED ORAL NIGHTLY PRN
COMMUNITY

## 2022-08-16 RX ORDER — AMITRIPTYLINE HYDROCHLORIDE 10 MG/1
10 TABLET, FILM COATED ORAL NIGHTLY PRN
Status: DISCONTINUED | OUTPATIENT
Start: 2022-08-16 | End: 2022-08-18 | Stop reason: HOSPADM

## 2022-08-16 RX ORDER — ASPIRIN 81 MG/1
81 TABLET ORAL DAILY
COMMUNITY

## 2022-08-16 RX ORDER — ALBUTEROL SULFATE 2.5 MG/3ML
2.5 SOLUTION RESPIRATORY (INHALATION) EVERY 4 HOURS PRN
Status: CANCELLED | OUTPATIENT
Start: 2022-08-16

## 2022-08-16 RX ORDER — DEXTROSE MONOHYDRATE 25 G/50ML
25 INJECTION, SOLUTION INTRAVENOUS
Status: DISCONTINUED | OUTPATIENT
Start: 2022-08-16 | End: 2022-08-18 | Stop reason: HOSPADM

## 2022-08-16 RX ORDER — METOPROLOL SUCCINATE 25 MG/1
12.5 TABLET, EXTENDED RELEASE ORAL
Status: DISCONTINUED | OUTPATIENT
Start: 2022-08-16 | End: 2022-08-18 | Stop reason: HOSPADM

## 2022-08-16 RX ORDER — LOSARTAN POTASSIUM 50 MG/1
100 TABLET ORAL
Status: CANCELLED | OUTPATIENT
Start: 2022-08-16

## 2022-08-16 RX ORDER — ENOXAPARIN SODIUM 100 MG/ML
40 INJECTION SUBCUTANEOUS 2 TIMES DAILY
Status: DISCONTINUED | OUTPATIENT
Start: 2022-08-16 | End: 2022-08-18 | Stop reason: HOSPADM

## 2022-08-16 RX ORDER — BUTALBITAL, ACETAMINOPHEN AND CAFFEINE 50; 325; 40 MG/1; MG/1; MG/1
1 TABLET ORAL EVERY 4 HOURS PRN
Status: DISCONTINUED | OUTPATIENT
Start: 2022-08-16 | End: 2022-08-18 | Stop reason: HOSPADM

## 2022-08-16 RX ORDER — ASPIRIN 81 MG/1
81 TABLET ORAL DAILY
Status: DISCONTINUED | OUTPATIENT
Start: 2022-08-16 | End: 2022-08-18 | Stop reason: HOSPADM

## 2022-08-16 RX ORDER — ALBUTEROL SULFATE 90 UG/1
2 AEROSOL, METERED RESPIRATORY (INHALATION) EVERY 6 HOURS PRN
Status: DISCONTINUED | OUTPATIENT
Start: 2022-08-16 | End: 2022-08-18 | Stop reason: HOSPADM

## 2022-08-16 RX ORDER — CETIRIZINE HYDROCHLORIDE 10 MG/1
10 TABLET ORAL DAILY
Status: DISCONTINUED | OUTPATIENT
Start: 2022-08-16 | End: 2022-08-18 | Stop reason: HOSPADM

## 2022-08-16 RX ORDER — HYDROCHLOROTHIAZIDE 25 MG/1
25 TABLET ORAL DAILY
COMMUNITY

## 2022-08-16 RX ADMIN — ASPIRIN 81 MG: 81 TABLET, COATED ORAL at 18:22

## 2022-08-16 RX ADMIN — DEXAMETHASONE SODIUM PHOSPHATE 6 MG: 4 INJECTION, SOLUTION INTRA-ARTICULAR; INTRALESIONAL; INTRAMUSCULAR; INTRAVENOUS; SOFT TISSUE at 08:56

## 2022-08-16 RX ADMIN — PANTOPRAZOLE SODIUM 40 MG: 40 TABLET, DELAYED RELEASE ORAL at 09:46

## 2022-08-16 RX ADMIN — BUDESONIDE AND FORMOTEROL FUMARATE DIHYDRATE 2 PUFF: 160; 4.5 AEROSOL RESPIRATORY (INHALATION) at 07:58

## 2022-08-16 RX ADMIN — ENOXAPARIN SODIUM 40 MG: 40 INJECTION SUBCUTANEOUS at 04:13

## 2022-08-16 RX ADMIN — INSULIN ASPART 2 UNITS: 100 INJECTION, SOLUTION INTRAVENOUS; SUBCUTANEOUS at 18:22

## 2022-08-16 RX ADMIN — BUTALBITAL, ACETAMINOPHEN, AND CAFFEINE 1 TABLET: 50; 325; 40 TABLET ORAL at 15:59

## 2022-08-16 RX ADMIN — ENOXAPARIN SODIUM 40 MG: 40 INJECTION SUBCUTANEOUS at 21:22

## 2022-08-16 RX ADMIN — INSULIN ASPART 2 UNITS: 100 INJECTION, SOLUTION INTRAVENOUS; SUBCUTANEOUS at 09:47

## 2022-08-16 RX ADMIN — KETOROLAC TROMETHAMINE 30 MG: 30 INJECTION, SOLUTION INTRAMUSCULAR at 00:12

## 2022-08-16 RX ADMIN — CETIRIZINE HYDROCHLORIDE 10 MG: 10 TABLET, FILM COATED ORAL at 16:00

## 2022-08-16 RX ADMIN — Medication 10 ML: at 21:22

## 2022-08-16 RX ADMIN — LOSARTAN POTASSIUM 50 MG: 50 TABLET, FILM COATED ORAL at 16:00

## 2022-08-16 RX ADMIN — SODIUM CHLORIDE 125 ML/HR: 9 INJECTION, SOLUTION INTRAVENOUS at 00:15

## 2022-08-16 RX ADMIN — REMDESIVIR 200 MG: 100 INJECTION, POWDER, LYOPHILIZED, FOR SOLUTION INTRAVENOUS at 03:20

## 2022-08-16 RX ADMIN — ACETAMINOPHEN 650 MG: 325 TABLET, FILM COATED ORAL at 05:54

## 2022-08-16 RX ADMIN — Medication 10 ML: at 08:57

## 2022-08-16 RX ADMIN — METOPROLOL SUCCINATE 12.5 MG: 25 TABLET, EXTENDED RELEASE ORAL at 09:46

## 2022-08-16 RX ADMIN — FUROSEMIDE 20 MG: 10 INJECTION, SOLUTION INTRAMUSCULAR; INTRAVENOUS at 16:00

## 2022-08-16 RX ADMIN — BUDESONIDE AND FORMOTEROL FUMARATE DIHYDRATE 2 PUFF: 160; 4.5 AEROSOL RESPIRATORY (INHALATION) at 19:19

## 2022-08-16 NOTE — PLAN OF CARE
Goal Outcome Evaluation:  Plan of Care Reviewed With: patient        Progress: no change  Outcome Evaluation: pt stable on 2L NC. A&Ox4. ad sheela. started on remdesivir. will continue with poc

## 2022-08-16 NOTE — ED PROVIDER NOTES
Subjective   52-year-old female who states that she just became ill this morning.  She developed fever, chills, body aches, frontal headache, cough, mild shortness of breath, generalized weakness.  Her  performed a home COVID test and it was positive, she felt worse as the day went along and she thus now presents to the emergency department for evaluation.          Review of Systems   All other systems reviewed and are negative.      Past Medical History:   Diagnosis Date   • Hypertension        No Known Allergies    Past Surgical History:   Procedure Laterality Date   • HYSTERECTOMY         History reviewed. No pertinent family history.    Social History     Socioeconomic History   • Marital status:    Tobacco Use   • Smoking status: Former Smoker     Quit date: 2019     Years since quittin.6   • Smokeless tobacco: Never Used   Substance and Sexual Activity   • Alcohol use: Not Currently   • Drug use: Never   • Sexual activity: Defer           Objective   Physical Exam  Vitals and nursing note reviewed.   Constitutional:       Appearance: Normal appearance. She is well-developed. She is ill-appearing. She is not toxic-appearing or diaphoretic.      Comments: Well-developed well-nourished female, alert and well-oriented, appears mildly to moderately dyspneic but she is able to converse using complete sentences.   HENT:      Head: Normocephalic and atraumatic.   Eyes:      General: No scleral icterus.     Pupils: Pupils are equal, round, and reactive to light.   Neck:      Trachea: No tracheal deviation.   Cardiovascular:      Rate and Rhythm: Regular rhythm.   Pulmonary:      Breath sounds: Normal breath sounds. No wheezing, rhonchi or rales.      Comments: Respirations mildly to moderately labored.  No added sounds are heard in the lungs.  Chest:      Chest wall: No tenderness.   Abdominal:      General: Bowel sounds are normal.      Palpations: Abdomen is soft.      Tenderness: There is no  abdominal tenderness. There is no guarding or rebound.   Musculoskeletal:         General: No tenderness. Normal range of motion.      Cervical back: Normal range of motion and neck supple. No rigidity or tenderness.      Right lower leg: No edema.      Left lower leg: No edema.   Skin:     General: Skin is warm and dry.      Capillary Refill: Capillary refill takes less than 2 seconds.      Coloration: Skin is not pale.   Neurological:      General: No focal deficit present.      Mental Status: She is alert and oriented to person, place, and time.      GCS: GCS eye subscore is 4. GCS verbal subscore is 5. GCS motor subscore is 6.      Motor: No abnormal muscle tone.   Psychiatric:         Mood and Affect: Mood normal.         Behavior: Behavior normal.         Procedures  CT Chest Without Contrast Diagnostic   Final Result   Bilateral lower lobe atelectasis, left greater than right. Mild hyperinflation of the right middle lobe suggesting air trapping. No evidence of acute pneumonia.      Signer Name: Blake Pike MD    Signed: 8/16/2022 3:18 AM    Workstation Name: [id number]     [facility]      XR Chest 1 View   Final Result   No acute cardiopulmonary findings.      Signer Name: Angel Luis Eric MD    Signed: 8/15/2022 10:44 PM    Workstation Name: [id number]     [facility]        Results for orders placed or performed during the hospital encounter of 08/15/22   COVID-19 and FLU A/B PCR - Swab, Nasopharynx    Specimen: Nasopharynx; Swab   Result Value Ref Range    COVID19 Detected (C) Not Detected - Ref. Range    Influenza A PCR Not Detected Not Detected    Influenza B PCR Not Detected Not Detected   Lactic Acid, Plasma    Specimen: Blood   Result Value Ref Range    Lactate 1.7 0.5 - 2.0 mmol/L   C-reactive Protein    Specimen: Blood   Result Value Ref Range    C-Reactive Protein 0.54 (H) 0.00 - 0.50 mg/dL   TSH    Specimen: Blood   Result Value Ref Range    TSH 0.769  0.270 - 4.200 uIU/mL   Magnesium    Specimen: Blood   Result Value Ref Range    Magnesium 2.1 1.6 - 2.6 mg/dL   Comprehensive Metabolic Panel    Specimen: Blood   Result Value Ref Range    Glucose 115 (H) 65 - 99 mg/dL    BUN 17 6 - 20 mg/dL    Creatinine 0.83 0.57 - 1.00 mg/dL    Sodium 141 136 - 145 mmol/L    Potassium 4.1 3.5 - 5.2 mmol/L    Chloride 105 98 - 107 mmol/L    CO2 23.8 22.0 - 29.0 mmol/L    Calcium 9.2 8.6 - 10.5 mg/dL    Total Protein 7.1 6.0 - 8.5 g/dL    Albumin 4.43 3.50 - 5.20 g/dL    ALT (SGPT) 19 1 - 33 U/L    AST (SGOT) 18 1 - 32 U/L    Alkaline Phosphatase 90 39 - 117 U/L    Total Bilirubin <0.2 0.0 - 1.2 mg/dL    Globulin 2.7 gm/dL    A/G Ratio 1.7 g/dL    BUN/Creatinine Ratio 20.5 7.0 - 25.0    Anion Gap 12.2 5.0 - 15.0 mmol/L    eGFR 83.9 >60.0 mL/min/1.73   Urinalysis With Culture If Indicated - Urine, Clean Catch    Specimen: Urine, Clean Catch   Result Value Ref Range    Color, UA Yellow Yellow, Straw    Appearance, UA Clear Clear    pH, UA 6.5 5.0 - 8.0    Specific Gravity, UA 1.026 1.005 - 1.030    Glucose, UA Negative Negative    Ketones, UA Negative Negative    Bilirubin, UA Negative Negative    Blood, UA Negative Negative    Protein, UA Negative Negative    Leuk Esterase, UA Negative Negative    Nitrite, UA Negative Negative    Urobilinogen, UA 1.0 E.U./dL 0.2 - 1.0 E.U./dL   CBC Auto Differential    Specimen: Blood   Result Value Ref Range    WBC 5.96 3.40 - 10.80 10*3/mm3    RBC 4.58 3.77 - 5.28 10*6/mm3    Hemoglobin 13.4 12.0 - 15.9 g/dL    Hematocrit 40.6 34.0 - 46.6 %    MCV 88.6 79.0 - 97.0 fL    MCH 29.3 26.6 - 33.0 pg    MCHC 33.0 31.5 - 35.7 g/dL    RDW 14.0 12.3 - 15.4 %    RDW-SD 45.4 37.0 - 54.0 fl    MPV 9.8 6.0 - 12.0 fL    Platelets 337 140 - 450 10*3/mm3    Neutrophil % 73.1 42.7 - 76.0 %    Lymphocyte % 9.4 (L) 19.6 - 45.3 %    Monocyte % 14.3 (H) 5.0 - 12.0 %    Eosinophil % 2.2 0.3 - 6.2 %    Basophil % 0.7 0.0 - 1.5 %    Immature Grans % 0.3 0.0 - 0.5 %     Neutrophils, Absolute 4.36 1.70 - 7.00 10*3/mm3    Lymphocytes, Absolute 0.56 (L) 0.70 - 3.10 10*3/mm3    Monocytes, Absolute 0.85 0.10 - 0.90 10*3/mm3    Eosinophils, Absolute 0.13 0.00 - 0.40 10*3/mm3    Basophils, Absolute 0.04 0.00 - 0.20 10*3/mm3    Immature Grans, Absolute 0.02 0.00 - 0.05 10*3/mm3    nRBC 0.0 0.0 - 0.2 /100 WBC   Blood Gas, Arterial With Co-Ox    Specimen: Arterial Blood   Result Value Ref Range    Site Right Radial     Matt's Test Positive     pH, Arterial 7.426 7.350 - 7.450 pH units    pCO2, Arterial 35.5 35.0 - 45.0 mm Hg    pO2, Arterial 59.4 (L) 83.0 - 108.0 mm Hg    HCO3, Arterial 23.3 20.0 - 26.0 mmol/L    Base Excess, Arterial -0.6 (L) 0.0 - 2.0 mmol/L    O2 Saturation, Arterial 92.0 (L) 94.0 - 99.0 %    Hemoglobin, Blood Gas 13.6 13.5 - 17.5 g/dL    Hematocrit, Blood Gas 41.6 38.0 - 51.0 %    Oxyhemoglobin 89.6 (L) 94 - 99 %    Methemoglobin 0.40 0.00 - 3.00 %    Carboxyhemoglobin 2.2 0 - 5 %    A-a DO2 91.0 0.0 - 300.0 mmHg    CO2 Content 24.4 22 - 33 mmol/L    Temperature 0.0 C    Barometric Pressure for Blood Gas 726 mmHg    Modality Nasal Cannula     FIO2 28 %    Flow Rate 2.0 lpm    Ventilator Mode NA     Note Read back and acknowledge     Notified Who DR STILL // RN      Notified By ALVARO     Collected by [id number]     pH, Temp Corrected      pCO2, Temperature Corrected      pO2, Temperature Corrected     BNP    Specimen: Blood   Result Value Ref Range    proBNP 21.5 0.0 - 900.0 pg/mL   D-dimer, Quantitative    Specimen: Blood   Result Value Ref Range    D-Dimer, Quantitative 0.31 0.00 - 0.50 MCGFEU/mL   Troponin    Specimen: Blood   Result Value Ref Range    Troponin T <0.010 0.000 - 0.030 ng/mL   Procalcitonin    Specimen: Blood   Result Value Ref Range    Procalcitonin 0.06 0.00 - 0.25 ng/mL   Lactate Dehydrogenase    Specimen: Blood   Result Value Ref Range     135 - 214 U/L   Ferritin    Specimen: Blood   Result Value Ref Range    Ferritin 43.91 13.00 - 150.00  ng/mL   Protime-INR    Specimen: Blood   Result Value Ref Range    Protime 12.9 12.1 - 14.7 Seconds    INR 0.95 0.90 - 1.10   aPTT    Specimen: Blood   Result Value Ref Range    PTT 35.3 (H) 26.5 - 34.5 seconds   Comprehensive Metabolic Panel    Specimen: Blood   Result Value Ref Range    Glucose 190 (H) 65 - 99 mg/dL    BUN 14 6 - 20 mg/dL    Creatinine 0.83 0.57 - 1.00 mg/dL    Sodium 142 136 - 145 mmol/L    Potassium 4.3 3.5 - 5.2 mmol/L    Chloride 105 98 - 107 mmol/L    CO2 23.1 22.0 - 29.0 mmol/L    Calcium 9.2 8.6 - 10.5 mg/dL    Total Protein 7.6 6.0 - 8.5 g/dL    Albumin 4.61 3.50 - 5.20 g/dL    ALT (SGPT) 21 1 - 33 U/L    AST (SGOT) 20 1 - 32 U/L    Alkaline Phosphatase 93 39 - 117 U/L    Total Bilirubin 0.2 0.0 - 1.2 mg/dL    Globulin 3.0 gm/dL    A/G Ratio 1.5 g/dL    BUN/Creatinine Ratio 16.9 7.0 - 25.0    Anion Gap 13.9 5.0 - 15.0 mmol/L    eGFR 83.9 >60.0 mL/min/1.73   CK    Specimen: Blood   Result Value Ref Range    Creatine Kinase 80 20 - 180 U/L   C-reactive Protein    Specimen: Blood   Result Value Ref Range    C-Reactive Protein 0.84 (H) 0.00 - 0.50 mg/dL   Ferritin    Specimen: Blood   Result Value Ref Range    Ferritin 50.30 13.00 - 150.00 ng/mL   CBC Auto Differential    Specimen: Blood   Result Value Ref Range    WBC 5.12 3.40 - 10.80 10*3/mm3    RBC 4.67 3.77 - 5.28 10*6/mm3    Hemoglobin 13.5 12.0 - 15.9 g/dL    Hematocrit 42.2 34.0 - 46.6 %    MCV 90.4 79.0 - 97.0 fL    MCH 28.9 26.6 - 33.0 pg    MCHC 32.0 31.5 - 35.7 g/dL    RDW 13.9 12.3 - 15.4 %    RDW-SD 46.2 37.0 - 54.0 fl    MPV 9.7 6.0 - 12.0 fL    Platelets 343 140 - 450 10*3/mm3    Neutrophil % 87.1 (H) 42.7 - 76.0 %    Lymphocyte % 7.4 (L) 19.6 - 45.3 %    Monocyte % 4.5 (L) 5.0 - 12.0 %    Eosinophil % 0.0 (L) 0.3 - 6.2 %    Basophil % 0.4 0.0 - 1.5 %    Immature Grans % 0.6 (H) 0.0 - 0.5 %    Neutrophils, Absolute 4.46 1.70 - 7.00 10*3/mm3    Lymphocytes, Absolute 0.38 (L) 0.70 - 3.10 10*3/mm3    Monocytes, Absolute 0.23 0.10  - 0.90 10*3/mm3    Eosinophils, Absolute 0.00 0.00 - 0.40 10*3/mm3    Basophils, Absolute 0.02 0.00 - 0.20 10*3/mm3    Immature Grans, Absolute 0.03 0.00 - 0.05 10*3/mm3    nRBC 0.0 0.0 - 0.2 /100 WBC   Bilirubin, Direct    Specimen: Blood   Result Value Ref Range    Bilirubin, Direct <0.2 0.0 - 0.3 mg/dL   ECG 12 Lead   Result Value Ref Range    QT Interval 312 ms    QTC Interval 430 ms   ECG 12 Lead   Result Value Ref Range    QT Interval 308 ms    QTC Interval 433 ms   Green Top (Gel)   Result Value Ref Range    Extra Tube Hold for add-ons.    Lavender Top   Result Value Ref Range    Extra Tube hold for add-on    Gold Top - SST   Result Value Ref Range    Extra Tube Hold for add-ons.    Light Blue Top   Result Value Ref Range    Extra Tube Hold for add-ons.                 ED Course  ED Course as of 08/16/22 0449   Mon Aug 15, 2022   2257 ER EKG REVIEW  RHYTHM: Tachycardia  RATE: 14 bpm  AR: 166 ms  QRS: 80 ms  QTC: 430 ms  Baseline artifact, no acute ST elevation   [LK]   2354 EKG shows sinus tachycardia, rate 119.  AR interval 158, QRS duration 70, QTc 433 ms.  Baseline artifact.  No apparent acute ischemia.  No evidence for STEMI. [CM]   e Aug 16, 2022   0323 Patient's emergency department stay has not been complicated.  She has done well.  Currently her oxygen saturation is 95% on room air.  Heart rate 101, blood pressure 144/77.  Case discussed with Dr. Burgos.  He is admitting patient to the hospitalist service. [CM]      ED Course User Index  [CM] Marcelo Pulido MD  [LK] Annelise Rosales DO                                           [facility]    Final diagnoses:   COVID-19   Hypoxia       ED Disposition  ED Disposition     ED Disposition   Decision to Admit    Condition   --    Comment   Level of Care: Med/Surg [1]   Diagnosis: Pneumonia due to COVID-19 virus [[id number]]   Admitting Physician: KAVON BURGOS [[id number]]   Attending Physician: KAVON BURGOS [[id number]]   Certification: I Certify That  Inpatient Hospital Services Are Medically Necessary For Greater Than 2 Midnights               Please note that portions of this note were completed with a voice recognition program.            Marcelo Pulido MD  08/16/22 3430

## 2022-08-16 NOTE — PLAN OF CARE
Goal Outcome Evaluation:  Plan of Care Reviewed With: patient        Progress: no change  Outcome Evaluation: Pt has rest well throughout the day in bed.  She has been sitting up in bed watching TV or on her phone.  She denies c/o pain when asked and she is free from non-verbal s/sx of discomfort at this time.  She is independent.  Safety measures in place, call light within reach.  Will continue to monitor.

## 2022-08-16 NOTE — CONSULTS
INFECTIOUS DISEASE CONSULTATION REPORT        Patient Identification:  Name:  Sridevi Napier  Age:  52 y.o.  Sex:  female  :  1970  MRN:  [id number]   Visit Number:  [id number]  Primary Care Physician:  Sebastian Ellison PA   Referring Provider: Asim Newman MD       LOS: 0 days        Subjective       Subjective     History of present illness:      Thank you Dr. Stewart for allowing us to participate in the care of your patient.  As you well know, Ms. Sridevi Napier is a 52 y.o. female with past medical history significant for hypertension and fibromyalgia, who presented to [facility] Emergency Department on 8/15/2022 for shortness of breath.     The patient is sitting up in bed on 2 L nasal cannula in no apparent distress. Reports that symptoms began a day before admission when she took a COVID-19 test at home which was positive. She is overall feeling significantly better today. Denies further shortness of breath this morning despite requiring 2 L nasal cannula, which she does not require at home. Continues to have a mildly productive cough.  Admits to myalgias and headaches.  Denies fever or chills.  Denies abdominal pain, nausea, vomiting, or diarrhea.  Denies dysuria.  Lungs are clear to auscultation bilaterally.  Abdomen is soft and nontender.  Afebrile.  CRP is mildly elevated at 0.84.  WBC remains normal at 5.12.  Lactic acid on admission was normal at 1.7.  Procalcitonin on admission was normal at 0.06.  Urinalysis on 2022 was negative.  CT chest on 2022 showed bilateral lower lobe atelectasis, left greater than right.  Mild hyperinflation of the right middle lobe suggesting air trapping.  No evidence of acute pneumonia.  Chest x-ray on 8/15/2022 was unremarkable.  COVID-19 and flu A/B PCR on 8/15/2022 detected COVID-19.  Blood cultures on 8/15/2022 are in progress.    Infectious Disease consultation was requested for antimicrobial  management.    ---------------------------------------------------------------------------------------------------------------------     Review Of Systems:    Constitutional: no fever, chills and night sweats. No appetite change or unexpected weight change. No fatigue.  Eyes: no eye drainage, itching or redness.  HEENT: no mouth sores, dysphagia or nose bleed.  Respiratory: no for shortness of breath.  Productive cough.  Cardiovascular: no chest pain, no palpitations, no orthopnea.  Gastrointestinal: no nausea, vomiting or diarrhea. No abdominal pain, hematemesis or rectal bleeding.  Genitourinary: no dysuria or polyuria.  Hematologic/lymphatic: no lymph node abnormalities, no easy bruising or easy bleeding.  Musculoskeletal: Myalgias.  Skin: No rash and no itching.  Neurological: no loss of consciousness, no seizure.  Headache.  Behavioral/Psych: no depression or suicidal ideation.  Endocrine: no hot flashes.  Immunologic: negative.    ---------------------------------------------------------------------------------------------------------------------     Past Medical History    Past Medical History:   Diagnosis Date   • Fibromyalgia    • Hypertension    • Respiratory illness     unspecified       Past Surgical History    Past Surgical History:   Procedure Laterality Date   • HYSTERECTOMY         Family History    History reviewed. No pertinent family history.    Social History    Social History     Tobacco Use   • Smoking status: Former Smoker     Quit date: 2019     Years since quittin.6   • Smokeless tobacco: Never Used   Substance Use Topics   • Alcohol use: Not Currently   • Drug use: Never       Allergies    Patient has no known allergies.  ---------------------------------------------------------------------------------------------------------------------     Home Medications:    Prior to Admission Medications     Prescriptions Last Dose Informant Patient Reported? Taking?    albuterol (PROVENTIL)  (5 MG/ML) 0.5% nebulizer solution  Self Yes No    Take 2.5 mg by nebulization Every 6 (Six) Hours As Needed for Wheezing or Shortness of Air.    aspirin 81 MG EC tablet   Yes No    Take 81 mg by mouth Daily.    budesonide (PULMICORT) 180 MCG/ACT inhaler  Self Yes No    Inhale 1 puff 2 (Two) Times a Day.    cetirizine (zyrTEC) 10 MG tablet   Yes No    Take 10 mg by mouth Daily.        ---------------------------------------------------------------------------------------------------------------------    Objective       Objective     Hospital Scheduled Meds:  budesonide-formoterol, 2 puff, Inhalation, BID - RT  dexamethasone, 6 mg, Intravenous, Daily  enoxaparin, 40 mg, Subcutaneous, BID  Insulin Aspart, 0-7 Units, Subcutaneous, TID AC  metoprolol succinate XL, 12.5 mg, Oral, Q24H  pantoprazole, 40 mg, Oral, Q AM  [START ON 8/17/2022] remdesivir, 100 mg, Intravenous, Q24H  sodium chloride, 10 mL, Intravenous, Q12H      Pharmacy Consult - Pharmacy to dose,   Pharmacy Consult - Remdesivir,       ---------------------------------------------------------------------------------------------------------------------   Vital Signs:  Temp:  [97.4 °F (36.3 °C)-98.2 °F (36.8 °C)] 97.4 °F (36.3 °C)  Heart Rate:  [] 88  Resp:  [18-20] 20  BP: (100-186)/(69-97) 164/91  Mean Arterial Pressure (Non-Invasive) for the past 24 hrs (Last 3 readings):   Noninvasive MAP (mmHg)   08/16/22 0318 98   08/16/22 0248 84   08/16/22 0218 87     SpO2 Percentage    08/16/22 0318 08/16/22 0638 08/16/22 0758   SpO2: 96% 90% 96%     SpO2:  [87 %-99 %] 96 %  on  Flow (L/min):  [2] 2;   Device (Oxygen Therapy): nasal cannula    Body mass index is 49.09 kg/m².  Wt Readings from Last 3 Encounters:   08/16/22 122 kg (268 lb 6.4 oz)   01/16/20 113 kg (250 lb)     ---------------------------------------------------------------------------------------------------------------------     Physical Exam:    Constitutional:  female is sitting up in  bed on 2 L nasal cannula in no apparent distress.  BMI 49.09.  HENT:  Head: Normocephalic and atraumatic.  Mouth:  Moist mucous membranes.    Eyes:  Conjunctivae and EOM are normal.  No scleral icterus.  Neck:  Neck supple.  No JVD present.    Cardiovascular:  Normal rate, regular rhythm and normal heart sounds with no murmur. No edema.  Pulmonary/Chest:  No respiratory distress, no wheezes, no crackles, with normal breath sounds and good air movement.  Abdominal:  Soft.  Bowel sounds are normal.  No distension and no tenderness.   Musculoskeletal:  No edema, no tenderness, and no deformity.  No swelling or redness of joints.  Neurological:  Alert and oriented to person, place, and time.  No facial droop.  No slurred speech.   Skin:  Skin is warm and dry.  No rash noted.  No pallor.   Psychiatric:  Normal mood and affect.  Behavior is normal.    ---------------------------------------------------------------------------------------------------------------------    Results from last 7 days   Lab Units 08/16/22  0403 08/15/22  2220   CK TOTAL U/L 80  --    TROPONIN T ng/mL  --  <0.010     Results from last 7 days   Lab Units 08/15/22  2220   PROBNP pg/mL 21.5       Results from last 7 days   Lab Units 08/15/22  2237   PH, ARTERIAL pH units 7.426   PO2 ART mm Hg 59.4*   PCO2, ARTERIAL mm Hg 35.5   HCO3 ART mmol/L 23.3     Results from last 7 days   Lab Units 08/16/22  0403 08/15/22  2220   CRP mg/dL 0.84* 0.54*   LACTATE mmol/L  --  1.7   WBC 10*3/mm3 5.12 5.96   HEMOGLOBIN g/dL 13.5 13.4   HEMATOCRIT % 42.2 40.6   MCV fL 90.4 88.6   MCHC g/dL 32.0 33.0   PLATELETS 10*3/mm3 343 337   INR   --  0.95     Results from last 7 days   Lab Units 08/16/22  0403 08/15/22  2220   SODIUM mmol/L 142 141   POTASSIUM mmol/L 4.3 4.1   MAGNESIUM mg/dL  --  2.1   CHLORIDE mmol/L 105 105   CO2 mmol/L 23.1 23.8   BUN mg/dL 14 17   CREATININE mg/dL 0.83 0.83   CALCIUM mg/dL 9.2 9.2   GLUCOSE mg/dL 190* 115*   ALBUMIN g/dL 4.61 4.43    BILIRUBIN mg/dL 0.2 <0.2   ALK PHOS U/L 93 90   AST (SGOT) U/L 20 18   ALT (SGPT) U/L 21 19   Estimated Creatinine Clearance: 96.5 mL/min (by C-G formula based on SCr of 0.83 mg/dL).  No results found for: AMMONIA    Hemoglobin A1C   Date/Time Value Ref Range Status   08/16/2022 0403 5.70 (H) 4.80 - 5.60 % Final     Glucose   Date/Time Value Ref Range Status   08/16/2022 0859 176 (H) 70 - 130 mg/dL Final     Comment:     Meter: [id number] : [id number] SANCHEZ BUSH     Lab Results   Component Value Date    HGBA1C 5.70 (H) 08/16/2022     Lab Results   Component Value Date    TSH 0.769 08/15/2022       No results found for: BLOODCX  No results found for: URINECX  No results found for: WOUNDCX  No results found for: STOOLCX  No results found for: RESPCX  Pain Management Panel    There is no flowsheet data to display.       I have personally reviewed the above laboratory results.   ---------------------------------------------------------------------------------------------------------------------  Imaging Results (Last 7 Days)     Procedure Component Value Units Date/Time    CT Chest Without Contrast Diagnostic [[id number]] Collected: 08/16/22 0318     Updated: 08/16/22 0321    Narrative:      CT Chest WO    INDICATION:   Hypoxia with clinical concern for pneumonia.    TECHNIQUE:   CT of the thorax without IV contrast. Coronal and sagittal reconstructions were obtained.  Radiation dose reduction techniques included automated exposure control or exposure modulation based on body size. Count of known CT and cardiac nuc med studies  performed in previous 12 months: 0.     COMPARISON:   None available.    FINDINGS:  Chest images at mediastinal window show no adenopathy or effusions. Upper abdominal structures are remarkable for fatty infiltration of the liver as well as a small cyst in the left lobe measuring 2 cm in diameter.    Chest images at lung window show platelike atelectasis in the lower lobes, greater on the  left. No definite focal infiltrates are seen. No CT findings present to indicate pneumonia. Note: CT may be negative in the earliest stages of COVID-19. Also noted  is mild air trapping in the right middle lobe. However, there is no evidence of right middle lobe bronchial stenosis.      Impression:      Bilateral lower lobe atelectasis, left greater than right. Mild hyperinflation of the right middle lobe suggesting air trapping. No evidence of acute pneumonia.    Signer Name: Blake Pike MD   Signed: 8/16/2022 3:18 AM   Workstation Name: [id number]    [facility]    XR Chest 1 View [[id number]] Collected: 08/15/22 2244     Updated: 08/15/22 2246    Narrative:      CR Chest 1 Vw    INDICATION:   soa     COMPARISON:    Chest x-ray 1/16/2020    FINDINGS:  Portable AP view(s) of the chest.      The heart and mediastinal contours are normal. The lungs are clear. No pneumothorax or pleural effusion.      Impression:      No acute cardiopulmonary findings.    Signer Name: Angel Luis Eric MD   Signed: 8/15/2022 10:44 PM   Workstation Name: [id number]    [facility]        I have personally reviewed the above radiology results.   ---------------------------------------------------------------------------------------------------------------------      Pertinent Infectious Disease Results    CRP is mildly elevated at 0.84.  WBC remains normal at 5.12.  Lactic acid on admission was normal at 1.7.  Procalcitonin on admission was normal at 0.06.  Urinalysis on 8/16/2022 was negative.  CT chest on 8/16/2022 showed bilateral lower lobe atelectasis, left greater than right.  Mild hyperinflation of the right middle lobe suggesting air trapping.  No evidence of acute pneumonia.  Chest x-ray on 8/15/2022 was unremarkable.  COVID-19 and flu A/B PCR on 8/15/2022 detected COVID-19.  Blood cultures on 8/15/2022 are in progress.    Assessment & Plan      Acute hypoxic respiratory failure  secondary to COVID-19 infection    The patient is sitting up in bed on 2 L nasal cannula in no apparent distress, but is on room air at baseline.  Clinically, she is feeling much better today and only has a mildly productive cough, myalgias, and headache.  Lungs are clear to auscultation bilaterally and physical exam is otherwise fairly unremarkable.  As CRP is only mildly elevated, procalcitonin remains normal, and imaging with chest x-ray and CT chest show no evidence of pneumonia, we do not believe patient has superimposed bacterial pneumonia at this time but rather simple COVID-19 infection.    We are agreeable with Decadron and remdesivir, as patient is requiring supplemental oxygen and is typically on room air.    Would recommend to hold antibiotic therapy at this time as patient is presenting with a typical COVID-19 infection without evidence of superimposed bacterial pneumonia.  We will follow closely and adjust therapy as appropriate.    Again, thank you Dr. Stewart for allowing us to participate in the care of your patient and please feel free to call for any questions you may have.    Code Status:     Code Status and Medical Interventions:   Ordered at: 08/16/22 0257     Level Of Support Discussed With:    Patient     Code Status (Patient has no pulse and is not breathing):    CPR (Attempt to Resuscitate)     Medical Interventions (Patient has pulse or is breathing):    Full Support     Kristen Sherman PA-C  08/16/22  10:52 EDT

## 2022-08-16 NOTE — CASE MANAGEMENT/SOCIAL WORK
Discharge Planning Assessment  [facility]     Patient Name: Sridevi Napier  MRN: [id number]  Today's Date: 8/16/2022    Admit Date: 8/15/2022     Discharge Needs Assessment     Row Name 08/16/22 1136       Living Environment    People in Home spouse;child(torres), adult;child(torres), dependent    Name(s) of People in Home Lives at home with spouse Chavo, 13 yr old daughter, grown son and daughter in law.    Current Living Arrangements home    Primary Care Provided by self    Provides Primary Care For child(torres)    Family Caregiver if Needed spouse    Family Caregiver Names Chavo Napier [phone]    Able to Return to Prior Arrangements yes       Resource/Environmental Concerns    Resource/Environmental Concerns none    Transportation Concerns none       Transition Planning    Patient/Family Anticipates Transition to home    Patient/Family Anticipated Services at Transition none    Transportation Anticipated family or friend will provide       Discharge Needs Assessment    Readmission Within the Last 30 Days no previous admission in last 30 days    Equipment Currently Used at Home nebulizer;pulse ox  Nebulizer via unknown provider. Pt stated her mother in law gave it to her.    Concerns to be Addressed no discharge needs identified;denies needs/concerns at this time    Anticipated Changes Related to Illness none    Equipment Needed After Discharge none    Patient's Choice of Community Agency(s) Pt does not have HH and denies any needs.               Discharge Plan     Row Name 08/16/22 1142       Plan    Plan CM spoke with pt via telephone. Pt lives at home with family and plans to return home at d/c. Pt's son has tested positive as well.  Pt has a pulse ox and nebulizer via unknown provider. Pt does not have HH and denies any needs. PCP is Sebastian BLAIR and she gets rx filled at [facility]. Pt has [employer] insurance and pt stated it does not cover much and they pay out of pocket for most things. Pt is  independent with her care at home. Pt's spouse will transport at d/c.    Patient/Family in Agreement with Plan yes    Plan Comments COVID+, no vaccine, resp failure. 8/16: Admit MS, enhanced isolation, Decadron IV, Remdesivir IV, Lovenox, Symbicort inhaler, alb inhaler prn, O2@2L sat 90%, up ad sheela, reg diet. Consult ID.              Expected Discharge Date and Time     Expected Discharge Date Expected Discharge Time    Aug 19, 2022         Jojo Krishnan RN

## 2022-08-16 NOTE — PROGRESS NOTES
[facility] HOSPITALIST PROGRESS NOTE     Patient Identification:  Name:  Sridevi Napier  Age:  52 y.o.  Sex:  female  :  1970  MRN:  [id number]  Visit Number:  [id number]  ROOM: [address] [place] [address]     Primary Care Provider:  Sebastian Ellison PA    Length of stay:  0    Subjective        Chief Compliant Follow up for hypoxia and COVID 19    Patient seen and examined this afternoon via telemedicine video.  Patient very comfortable and sitting on the bed.  States that shortness of breath is improved and denies any shortness of breath at rest and minimal with exertion.  Denies any pedal edema.  Denies any nausea vomiting or pain.  Has mild tickling causing cough.  Afebrile no events overnight.  On 2 L nasal cannula.       Objective     Current Hospital Meds:budesonide-formoterol, 2 puff, Inhalation, BID - RT  cetirizine, 10 mg, Oral, Daily  dexamethasone, 6 mg, Intravenous, Daily  enoxaparin, 40 mg, Subcutaneous, BID  Insulin Aspart, 0-7 Units, Subcutaneous, TID AC  losartan, 50 mg, Oral, Q24H  metoprolol succinate XL, 12.5 mg, Oral, Q24H  pantoprazole, 40 mg, Oral, Q AM  [START ON 2022] remdesivir, 100 mg, Intravenous, Q24H  sodium chloride, 10 mL, Intravenous, Q12H    Pharmacy Consult - Pharmacy to dose,   Pharmacy Consult - Remdesivir,       ----------------------------------------------------------------------------------------------------------------------  Vital Signs:  Temp:  [97.4 °F (36.3 °C)-98.2 °F (36.8 °C)] 97.5 °F (36.4 °C)  Heart Rate:  [] 98  Resp:  [18-20] 18  BP: (100-186)/(69-97) 175/90  SpO2:  [87 %-99 %] 93 %  on  Flow (L/min):  [2] 2;   Device (Oxygen Therapy): nasal cannula  Body mass index is 49.09 kg/m².    Wt Readings from Last 3 Encounters:   22 122 kg (268 lb 6.4 oz)   20 113 kg (250 lb)       Intake/Output Summary (Last 24 hours) at 2022 1650  Last data filed at 2022 0900  Gross per 24 hour   Intake 1530 ml   Output --   Net 1530 ml     Diet  Regular; Cardiac  ----------------------------------------------------------------------------------------------------------------------  Physical exam:  This physical exam has been personally performed remotely in the unit aided by real-time audio/visual communication tools. Nursing present at bedside during this exam and assisted during exam. The use of a video visit has been reviewed with the patient and verbal informed consent has been obtained.     Physical Exam:  General: Patient appears awake, alert, and in no acute distress.  Head: Normocephalic, atraumatic  Eyes: EOMI. Conjunctivae and sclerae normal.  Ears: Ears appear intact with no abnormalities noted.   Neck: Trachea midline. No obvious JVD.  Heart: Tele reveals sinus rhythm  Lungs: Respirations appear to be regular, even and unlabored with no signs of respiratory distress. No audible wheezing.  Abdomen: No obvious abdominal distension.  MS: Muscle tone appears normal. No gross deformities.  Extremities: No clubbing, cyanosis or edema noted.  Skin: No visible bleeding, bruising, or rash.  Neurologic: Alert and oriented x3. No gross focal deficits.       ----------------------------------------------------------------------------------------------------------------------  ----------------------------------------------------------------------------------------------------------------------  Results from last 7 days   Lab Units 08/16/22  0403 08/15/22  2220   CRP mg/dL 0.84* 0.54*   LACTATE mmol/L  --  1.7   WBC 10*3/mm3 5.12 5.96   HEMOGLOBIN g/dL 13.5 13.4   HEMATOCRIT % 42.2 40.6   MCV fL 90.4 88.6   MCHC g/dL 32.0 33.0   PLATELETS 10*3/mm3 343 337   INR   --  0.95     Results from last 7 days   Lab Units 08/16/22  0403 08/15/22  2220   SODIUM mmol/L 142 141   POTASSIUM mmol/L 4.3 4.1   MAGNESIUM mg/dL  --  2.1   CHLORIDE mmol/L 105 105   CO2 mmol/L 23.1 23.8   BUN mg/dL 14 17   CREATININE mg/dL 0.83 0.83   CALCIUM mg/dL 9.2 9.2   GLUCOSE mg/dL 190*  115*   ALBUMIN g/dL 4.61 4.43   BILIRUBIN mg/dL 0.2 <0.2   ALK PHOS U/L 93 90   AST (SGOT) U/L 20 18   ALT (SGPT) U/L 21 19   Estimated Creatinine Clearance: 96.5 mL/min (by C-G formula based on SCr of 0.83 mg/dL).  Results from last 7 days   Lab Units 08/16/22  0403 08/15/22  2220   CK TOTAL U/L 80  --    TROPONIN T ng/mL  --  <0.010     Hemoglobin A1C   Date/Time Value Ref Range Status   08/16/2022 0403 5.70 (H) 4.80 - 5.60 % Final     Glucose   Date/Time Value Ref Range Status   08/16/2022 1608 188 (H) 70 - 130 mg/dL Final     Comment:     Meter: [id number] : [id number] SANCHEZ BUSH   08/16/2022 1122 135 (H) 70 - 130 mg/dL Final     Comment:     Meter: [id number] : [id number] SANCHEZ BUSH   08/16/2022 0859 176 (H) 70 - 130 mg/dL Final     Comment:     Meter: [id number] : [id number] SANCHEZ BUSH     No results found for: AMMONIA    Results from last 7 days   Lab Units 08/16/22  0014   NITRITE UA  Negative     No results found for: BLOODCXNo results found for: RESPCXNo results found for: URINECXNo results found for: WOUNDCXNo results found for: BODYFLDCXNo results found for: STOOLCX  I have personally looked at the labs and they are summarized above.  ----------------------------------------------------------------------------------------------------------------------  Imaging Results (Last 24 Hours)     Procedure Component Value Units Date/Time    CT Chest Without Contrast Diagnostic [[id number]] Collected: 08/16/22 0318     Updated: 08/16/22 0321    Narrative:      CT Chest WO    INDICATION:   Hypoxia with clinical concern for pneumonia.    TECHNIQUE:   CT of the thorax without IV contrast. Coronal and sagittal reconstructions were obtained.  Radiation dose reduction techniques included automated exposure control or exposure modulation based on body size. Count of known CT and cardiac nuc med studies  performed in previous 12 months: 0.     COMPARISON:   None available.    FINDINGS:  Chest images at  mediastinal window show no adenopathy or effusions. Upper abdominal structures are remarkable for fatty infiltration of the liver as well as a small cyst in the left lobe measuring 2 cm in diameter.    Chest images at lung window show platelike atelectasis in the lower lobes, greater on the left. No definite focal infiltrates are seen. No CT findings present to indicate pneumonia. Note: CT may be negative in the earliest stages of COVID-19. Also noted  is mild air trapping in the right middle lobe. However, there is no evidence of right middle lobe bronchial stenosis.      Impression:      Bilateral lower lobe atelectasis, left greater than right. Mild hyperinflation of the right middle lobe suggesting air trapping. No evidence of acute pneumonia.    Signer Name: Blake Piek MD   Signed: 8/16/2022 3:18 AM   Workstation Name: [id number]    [facility]    XR Chest 1 View [[id number]] Collected: 08/15/22 2244     Updated: 08/15/22 2246    Narrative:      CR Chest 1 Vw    INDICATION:   soa     COMPARISON:    Chest x-ray 1/16/2020    FINDINGS:  Portable AP view(s) of the chest.      The heart and mediastinal contours are normal. The lungs are clear. No pneumothorax or pleural effusion.      Impression:      No acute cardiopulmonary findings.    Signer Name: Angel Luis Eric MD   Signed: 8/15/2022 10:44 PM   Workstation Name: [id number]    [facility]        I have personally reviewed the radiology images and read the final radiology report.    Assessment & Plan      Assessment:  Mild acute COVID 19 infection with no evidence of Pneumonia on CT chest  Acute hypoxic respiratory failure secondary to COVID 19  Probable COPD  Pre-diabetes A1C 5.7  Hyperglycemia secondary to steroids  Essential HTN  Morbid Obesity, BMI 49.09      Plan:  For mild COVID-19 infection, will do remdesivir for 3 days.  Currently patient is afebrile and VSS.  No leukocytosis and CRP minimally elevated.   COVID-19 labs including ferritin D-dimer normal.  CT chest showed Bilateral lower lobe atelectasis, left greater than right. Mild hyperinflation of the right middle lobe suggesting air trapping. No evidence of acute pneumonia.     Acute hypoxic respiratory failure secondary to mild COVID 19 infection, currently FiO2 stable on 2 L nasal cannula.  On IV Decadron.  We will continue for total 10 days.  We will do a dose of IV Lasix to improve lung compliance. Will get 2D echo.  We will do walking oximetry before discharge to see if patient needs home O2.    Probable COPD vs asthma, clinically.  Patient responded to inhaled steroids and albuterol inhaler.  The patient has never had PFT done or evaluated for asthma.  Advised to have outpatient PFT done after discharge.     Pre-diabetes A1C 5.7. Hyperglycemia secondary to steroids.  We will do low-dose sliding scale.  Monitor CBG.    Essential HTN, takes olmesartan and HCTZ at home.  Will start on losartan and on Toprol XL low-dose.  Monitor and adjust.    Lovenox subcu for DVT prophylaxis.  Start on Protonix for GI prophylaxis.  Ambulating.    Management and plans discussed in detail with patient and nursing.      The patient is high risk due to the following diagnoses/reasons:  Mild acute COVID 19 infection with no evidence of Pneumonia on CT chest  Acute hypoxic respiratory failure secondary to COVID 19    Disposition Home in 24 to 48 hours    Nevin Stewart MD  08/16/22  16:50 EDT

## 2022-08-16 NOTE — H&P
Hospitalist History and Physical        Patient Identification  Name: Sridevi Napier  Age/Sex: 52 y.o. female  :  1970        MRN: [id number]  Visit Number: [id number]  Admit Date: 8/15/2022   PCP: Sebastian Ellison PA          Chief complaint short of breath    History of Present Illness:  Patient is a 52 y.o. female with history of HTN, fibromyalgia, and an unspecified respiratory illness that developed in late  that pre-dated the COVID-19 pandemic and maintream diagnostic testing, for which she still takes scheduled pulmicort inhaler treatments and albuterol nebulizer treatments PRN, who presents with complaints of acute onset dyspnea of 1 day duration. She reports she felt fine until yesterday morning, at which time she became more short of breath than usual and also developed diffuse myalgias, headache, and fever/chills. She denies loss of taste or smell, nausea, or diarrhea. She denies dysuria. She denies chest pain. Her family bought a home COVID-19 testing kit and she was positive. She has a pulse oximeter at home which showed her oxygen was dropping to the low 80s, prompting her to come to the ED for further evaluation.     Review of Systems  Review of Systems   Constitutional: Positive for activity change, chills, fatigue and fever. Negative for appetite change, diaphoresis and unexpected weight change.   HENT: Negative for congestion, postnasal drip, rhinorrhea, sinus pressure, sinus pain and sore throat.    Eyes: Negative for photophobia, pain, discharge, redness, itching and visual disturbance.   Respiratory: Positive for cough and shortness of breath. Negative for wheezing.    Cardiovascular: Negative for chest pain, palpitations and leg swelling.   Gastrointestinal: Negative for abdominal distention, abdominal pain, constipation, diarrhea, nausea and vomiting.   Endocrine: Negative for cold intolerance, heat intolerance, polydipsia, polyphagia and polyuria.   Genitourinary: Negative  for difficulty urinating, dysuria, flank pain, frequency and hematuria.   Musculoskeletal: Positive for myalgias. Negative for arthralgias, back pain, joint swelling, neck pain and neck stiffness.   Skin: Negative for color change, pallor, rash and wound.   Neurological: Positive for headaches. Negative for dizziness, tremors, seizures, syncope, weakness, light-headedness and numbness.   Hematological: Negative for adenopathy. Does not bruise/bleed easily.   Psychiatric/Behavioral: Negative for agitation, behavioral problems and confusion.       History  Past Medical History:   Diagnosis Date   • Fibromyalgia    • Hypertension    • Respiratory illness     unspecified     Past Surgical History:   Procedure Laterality Date   • HYSTERECTOMY       History reviewed. No pertinent family history.  Social History     Tobacco Use   • Smoking status: Former Smoker     Quit date: 2019     Years since quittin.6   • Smokeless tobacco: Never Used   Substance Use Topics   • Alcohol use: Not Currently   • Drug use: Never     Medications Prior to Admission   Medication Sig Dispense Refill Last Dose   • albuterol (PROVENTIL) (5 MG/ML) 0.5% nebulizer solution Take 2.5 mg by nebulization Every 6 (Six) Hours As Needed for Wheezing or Shortness of Air.      • budesonide (PULMICORT) 180 MCG/ACT inhaler Inhale 1 puff 2 (Two) Times a Day.      • cetirizine-pseudoephedrine (ZyrTEC-D) 5-120 MG per 12 hr tablet Take 1 tablet by mouth Daily.        Allergies:  Patient has no known allergies.    Objective     Vital Signs  Temp:  [98.1 °F (36.7 °C)-98.2 °F (36.8 °C)] 98.1 °F (36.7 °C)  Heart Rate:  [] 100  Resp:  [18-20] 18  BP: (100-186)/(69-97) 165/97  Body mass index is 49.09 kg/m².    Physical Exam:  Physical Exam  Constitutional:       General: She is not in acute distress.     Appearance: Normal appearance. She is obese. She is ill-appearing.   HENT:      Head: Normocephalic and atraumatic.      Right Ear: External ear  normal.      Left Ear: External ear normal.      Nose: Nose normal.      Mouth/Throat:      Mouth: Mucous membranes are moist.      Pharynx: Oropharynx is clear.   Eyes:      Extraocular Movements: Extraocular movements intact.      Conjunctiva/sclera: Conjunctivae normal.      Pupils: Pupils are equal, round, and reactive to light.   Cardiovascular:      Rate and Rhythm: Regular rhythm. Tachycardia present.      Pulses: Normal pulses.      Heart sounds: Normal heart sounds. No murmur heard.  Pulmonary:      Effort: Pulmonary effort is normal.      Breath sounds: No wheezing or rales.      Comments: Mildly diminished breath sounds diffusely, perhaps due to body habitus.   Abdominal:      General: Abdomen is flat. Bowel sounds are normal. There is no distension.      Palpations: Abdomen is soft.      Tenderness: There is no abdominal tenderness.   Musculoskeletal:         General: Normal range of motion.      Cervical back: Normal range of motion and neck supple. No tenderness.      Right lower leg: No edema.      Left lower leg: No edema.   Lymphadenopathy:      Cervical: No cervical adenopathy.   Skin:     General: Skin is warm and dry.      Capillary Refill: Capillary refill takes less than 2 seconds.      Coloration: Skin is not jaundiced.      Findings: No bruising or lesion.   Neurological:      General: No focal deficit present.      Mental Status: She is alert and oriented to person, place, and time.   Psychiatric:         Mood and Affect: Mood normal.         Behavior: Behavior normal.         Thought Content: Thought content normal.         Judgment: Judgment normal.           Results Review:       Lab Results:  Results from last 7 days   Lab Units 08/16/22  0403 08/15/22  2220   WBC 10*3/mm3 5.12 5.96   HEMOGLOBIN g/dL 13.5 13.4   PLATELETS 10*3/mm3 343 337     Results from last 7 days   Lab Units 08/16/22  0403 08/15/22  2220   CRP mg/dL 0.84* 0.54*     Results from last 7 days   Lab Units 08/16/22  0403  08/15/22  2220   SODIUM mmol/L 142 141   POTASSIUM mmol/L 4.3 4.1   CHLORIDE mmol/L 105 105   CO2 mmol/L 23.1 23.8   BUN mg/dL 14 17   CREATININE mg/dL 0.83 0.83   CALCIUM mg/dL 9.2 9.2   GLUCOSE mg/dL 190* 115*     Results from last 7 days   Lab Units 08/15/22  2220   MAGNESIUM mg/dL 2.1     No results found for: HGBA1C  Results from last 7 days   Lab Units 08/16/22  0403 08/15/22  2220   BILIRUBIN mg/dL 0.2 <0.2   ALK PHOS U/L 93 90   AST (SGOT) U/L 20 18   ALT (SGPT) U/L 21 19     Results from last 7 days   Lab Units 08/16/22  0403 08/15/22  2220   CK TOTAL U/L 80  --    TROPONIN T ng/mL  --  <0.010         Results from last 7 days   Lab Units 08/15/22  2220   INR  0.95     Results from last 7 days   Lab Units 08/15/22  2237   PH, ARTERIAL pH units 7.426   PO2 ART mm Hg 59.4*   PCO2, ARTERIAL mm Hg 35.5   HCO3 ART mmol/L 23.3       I have reviewed the patient's laboratory results.    Imaging:  Imaging Results (Last 72 Hours)     Procedure Component Value Units Date/Time    CT Chest Without Contrast Diagnostic [[id number]] Collected: 08/16/22 0318     Updated: 08/16/22 0321    Narrative:      CT Chest WO    INDICATION:   Hypoxia with clinical concern for pneumonia.    TECHNIQUE:   CT of the thorax without IV contrast. Coronal and sagittal reconstructions were obtained.  Radiation dose reduction techniques included automated exposure control or exposure modulation based on body size. Count of known CT and cardiac nuc med studies  performed in previous 12 months: 0.     COMPARISON:   None available.    FINDINGS:  Chest images at mediastinal window show no adenopathy or effusions. Upper abdominal structures are remarkable for fatty infiltration of the liver as well as a small cyst in the left lobe measuring 2 cm in diameter.    Chest images at lung window show platelike atelectasis in the lower lobes, greater on the left. No definite focal infiltrates are seen. No CT findings present to indicate pneumonia. Note: CT  may be negative in the earliest stages of COVID-19. Also noted  is mild air trapping in the right middle lobe. However, there is no evidence of right middle lobe bronchial stenosis.      Impression:      Bilateral lower lobe atelectasis, left greater than right. Mild hyperinflation of the right middle lobe suggesting air trapping. No evidence of acute pneumonia.    Signer Name: Blake Pike MD   Signed: 8/16/2022 3:18 AM   Workstation Name: [id number]    [facility]    XR Chest 1 View [[id number]] Collected: 08/15/22 2244     Updated: 08/15/22 2246    Narrative:      CR Chest 1 Vw    INDICATION:   soa     COMPARISON:    Chest x-ray 1/16/2020    FINDINGS:  Portable AP view(s) of the chest.      The heart and mediastinal contours are normal. The lungs are clear. No pneumothorax or pleural effusion.      Impression:      No acute cardiopulmonary findings.    Signer Name: Angel Luis Eric MD   Signed: 8/15/2022 10:44 PM   Workstation Name: [id number]    [facility]          I have personally reviewed the patient's radiologic imaging.        EKG:   Sinus tachycardia, , QTc 430  Low voltage QRS  Cannot rule out Anterior infarct , age undetermined  Abnormal ECG  When compared with ECG of 15-AUG-2022 21:52, (Unconfirmed)  No significant change was found    I have personally reviewed the patient's EKG.        Assessment & Plan     - Acute hypoxic respiratory failure secondary to COVID-19 infection: no evidence of pneumonia on CXR or CT imaging, but given acute onset of symptoms, may be too early to detect typical COVID-19 pneumonia changes on imaging. Continue supplemental oxygen, currently doing well on 2L NC. Meets criteria for remdesivir and decadron in light of hypoxia. Continue to trend disease progression markers. Consult ID for further assistance in management.    - Unspecified respiratory illness that predates COVID-19 pandemic and mainstream diagnostic testing by  several months: still on inhalers and PRN nebulizers at home since diagnosed in late 2019. Reports smoking history prior to onset of symptoms--perhaps has undiagnosed COPD but has not been formally tested. If so, steroids given for COVID-19 should help. Will order symbicort inhaler BID along with PRN albuterol inhaler while hospitalized.   - Hypertension: patient reports she is on medication for this, but nothing listed on prelim med list. Continue to monitor BP for now, most recent check was 165/97.   - Fibromyalgia: supportive care.   - Hyperglycemia, mild: glucose 190 on morning labs but this was after steroid administration. Check A1c.   - Morbid obesity, BMI 49, negatively impacting all aspects of care.    DVT Prophylaxis: SQ lovenox    Estimated Length of Stay >2 midnights    I discussed the patient's findings, assessment and plan with the patient and nursing staff on [facility].    * patient is high risk due to COVID-19 infection, acute hypoxic respiratory failure    Asim Newman MD  08/16/22  05:47 EDT

## 2022-08-17 PROBLEM — U07.1 COVID-19: Status: ACTIVE | Noted: 2022-08-17

## 2022-08-17 LAB
ALBUMIN SERPL-MCNC: 4.09 G/DL (ref 3.5–5.2)
ALBUMIN/GLOB SERPL: 1.6 G/DL
ALP SERPL-CCNC: 85 U/L (ref 39–117)
ALT SERPL W P-5'-P-CCNC: 23 U/L (ref 1–33)
ANION GAP SERPL CALCULATED.3IONS-SCNC: 11 MMOL/L (ref 5–15)
APTT PPP: 27.6 SECONDS (ref 26.5–34.5)
AST SERPL-CCNC: 21 U/L (ref 1–32)
BILIRUB SERPL-MCNC: <0.2 MG/DL (ref 0–1.2)
BUN SERPL-MCNC: 18 MG/DL (ref 6–20)
BUN/CREAT SERPL: 23.4 (ref 7–25)
CALCIUM SPEC-SCNC: 9.2 MG/DL (ref 8.6–10.5)
CHLORIDE SERPL-SCNC: 107 MMOL/L (ref 98–107)
CO2 SERPL-SCNC: 24 MMOL/L (ref 22–29)
CREAT SERPL-MCNC: 0.77 MG/DL (ref 0.57–1)
CRP SERPL-MCNC: 0.66 MG/DL (ref 0–0.5)
D DIMER PPP FEU-MCNC: 0.36 MCGFEU/ML (ref 0–0.5)
DEPRECATED RDW RBC AUTO: 47.3 FL (ref 37–54)
EGFRCR SERPLBLD CKD-EPI 2021: 91.8 ML/MIN/1.73
ERYTHROCYTE [DISTWIDTH] IN BLOOD BY AUTOMATED COUNT: 14 % (ref 12.3–15.4)
GLOBULIN UR ELPH-MCNC: 2.5 GM/DL
GLUCOSE BLDC GLUCOMTR-MCNC: 106 MG/DL (ref 70–130)
GLUCOSE BLDC GLUCOMTR-MCNC: 124 MG/DL (ref 70–130)
GLUCOSE BLDC GLUCOMTR-MCNC: 128 MG/DL (ref 70–130)
GLUCOSE BLDC GLUCOMTR-MCNC: 169 MG/DL (ref 70–130)
GLUCOSE SERPL-MCNC: 131 MG/DL (ref 65–99)
HCT VFR BLD AUTO: 41.2 % (ref 34–46.6)
HGB BLD-MCNC: 13.1 G/DL (ref 12–15.9)
LYMPHOCYTES # BLD MANUAL: 1.18 10*3/MM3 (ref 0.7–3.1)
LYMPHOCYTES NFR BLD MANUAL: 18 % (ref 5–12)
MAGNESIUM SERPL-MCNC: 2.4 MG/DL (ref 1.6–2.6)
MCH RBC QN AUTO: 29.2 PG (ref 26.6–33)
MCHC RBC AUTO-ENTMCNC: 31.8 G/DL (ref 31.5–35.7)
MCV RBC AUTO: 92 FL (ref 79–97)
MONOCYTES # BLD: 0.97 10*3/MM3 (ref 0.1–0.9)
NEUTROPHILS # BLD AUTO: 3.23 10*3/MM3 (ref 1.7–7)
NEUTROPHILS NFR BLD MANUAL: 60 % (ref 42.7–76)
PLAT MORPH BLD: NORMAL
PLATELET # BLD AUTO: 320 10*3/MM3 (ref 140–450)
PMV BLD AUTO: 10.5 FL (ref 6–12)
POTASSIUM SERPL-SCNC: 3.9 MMOL/L (ref 3.5–5.2)
PROT SERPL-MCNC: 6.6 G/DL (ref 6–8.5)
RBC # BLD AUTO: 4.48 10*6/MM3 (ref 3.77–5.28)
RBC MORPH BLD: NORMAL
SCAN SLIDE: NORMAL
SODIUM SERPL-SCNC: 142 MMOL/L (ref 136–145)
VARIANT LYMPHS NFR BLD MANUAL: 22 % (ref 19.6–45.3)
WBC NRBC COR # BLD: 5.38 10*3/MM3 (ref 3.4–10.8)

## 2022-08-17 PROCEDURE — 85379 FIBRIN DEGRADATION QUANT: CPT | Performed by: HOSPITALIST

## 2022-08-17 PROCEDURE — 85730 THROMBOPLASTIN TIME PARTIAL: CPT | Performed by: HOSPITALIST

## 2022-08-17 PROCEDURE — 94799 UNLISTED PULMONARY SVC/PX: CPT

## 2022-08-17 PROCEDURE — 99214 OFFICE O/P EST MOD 30 MIN: CPT | Performed by: PHYSICIAN ASSISTANT

## 2022-08-17 PROCEDURE — 94761 N-INVAS EAR/PLS OXIMETRY MLT: CPT

## 2022-08-17 PROCEDURE — 80053 COMPREHEN METABOLIC PANEL: CPT | Performed by: HOSPITALIST

## 2022-08-17 PROCEDURE — 85025 COMPLETE CBC W/AUTO DIFF WBC: CPT | Performed by: HOSPITALIST

## 2022-08-17 PROCEDURE — 82962 GLUCOSE BLOOD TEST: CPT

## 2022-08-17 PROCEDURE — G0378 HOSPITAL OBSERVATION PER HR: HCPCS

## 2022-08-17 PROCEDURE — 25010000002 ENOXAPARIN PER 10 MG

## 2022-08-17 PROCEDURE — 25010000002 DEXAMETHASONE PER 1 MG: Performed by: HOSPITALIST

## 2022-08-17 PROCEDURE — 25010000002 FUROSEMIDE PER 20 MG: Performed by: HOSPITALIST

## 2022-08-17 PROCEDURE — 25010000002 REMDESIVIR 100 MG RECONSTITUTED SOLUTION: Performed by: HOSPITALIST

## 2022-08-17 PROCEDURE — 96372 THER/PROPH/DIAG INJ SC/IM: CPT

## 2022-08-17 PROCEDURE — 96376 TX/PRO/DX INJ SAME DRUG ADON: CPT

## 2022-08-17 PROCEDURE — 86140 C-REACTIVE PROTEIN: CPT | Performed by: HOSPITALIST

## 2022-08-17 PROCEDURE — 83735 ASSAY OF MAGNESIUM: CPT | Performed by: HOSPITALIST

## 2022-08-17 PROCEDURE — 85007 BL SMEAR W/DIFF WBC COUNT: CPT | Performed by: HOSPITALIST

## 2022-08-17 PROCEDURE — 99225 PR SBSQ OBSERVATION CARE/DAY 25 MINUTES: CPT | Performed by: HOSPITALIST

## 2022-08-17 PROCEDURE — 63710000001 INSULIN ASPART PER 5 UNITS: Performed by: HOSPITALIST

## 2022-08-17 RX ORDER — FUROSEMIDE 10 MG/ML
20 INJECTION INTRAMUSCULAR; INTRAVENOUS ONCE
Status: COMPLETED | OUTPATIENT
Start: 2022-08-17 | End: 2022-08-17

## 2022-08-17 RX ADMIN — ENOXAPARIN SODIUM 40 MG: 40 INJECTION SUBCUTANEOUS at 08:22

## 2022-08-17 RX ADMIN — INSULIN ASPART 2 UNITS: 100 INJECTION, SOLUTION INTRAVENOUS; SUBCUTANEOUS at 17:03

## 2022-08-17 RX ADMIN — FUROSEMIDE 20 MG: 10 INJECTION, SOLUTION INTRAMUSCULAR; INTRAVENOUS at 16:09

## 2022-08-17 RX ADMIN — REMDESIVIR 100 MG: 100 INJECTION, POWDER, LYOPHILIZED, FOR SOLUTION INTRAVENOUS at 03:40

## 2022-08-17 RX ADMIN — ENOXAPARIN SODIUM 40 MG: 40 INJECTION SUBCUTANEOUS at 21:57

## 2022-08-17 RX ADMIN — BUDESONIDE AND FORMOTEROL FUMARATE DIHYDRATE 2 PUFF: 160; 4.5 AEROSOL RESPIRATORY (INHALATION) at 19:48

## 2022-08-17 RX ADMIN — ASPIRIN 81 MG: 81 TABLET, COATED ORAL at 08:23

## 2022-08-17 RX ADMIN — ALBUTEROL SULFATE 2 PUFF: 90 AEROSOL, METERED RESPIRATORY (INHALATION) at 19:48

## 2022-08-17 RX ADMIN — Medication 10 ML: at 21:56

## 2022-08-17 RX ADMIN — Medication 10 ML: at 08:23

## 2022-08-17 RX ADMIN — CETIRIZINE HYDROCHLORIDE 10 MG: 10 TABLET, FILM COATED ORAL at 08:23

## 2022-08-17 RX ADMIN — PANTOPRAZOLE SODIUM 40 MG: 40 TABLET, DELAYED RELEASE ORAL at 05:27

## 2022-08-17 RX ADMIN — BUDESONIDE AND FORMOTEROL FUMARATE DIHYDRATE 2 PUFF: 160; 4.5 AEROSOL RESPIRATORY (INHALATION) at 07:26

## 2022-08-17 RX ADMIN — ALBUTEROL SULFATE 2 PUFF: 90 AEROSOL, METERED RESPIRATORY (INHALATION) at 07:26

## 2022-08-17 RX ADMIN — LOSARTAN POTASSIUM 50 MG: 50 TABLET, FILM COATED ORAL at 08:23

## 2022-08-17 RX ADMIN — DEXAMETHASONE SODIUM PHOSPHATE 6 MG: 4 INJECTION, SOLUTION INTRA-ARTICULAR; INTRALESIONAL; INTRAMUSCULAR; INTRAVENOUS; SOFT TISSUE at 08:22

## 2022-08-17 RX ADMIN — METOPROLOL SUCCINATE 12.5 MG: 25 TABLET, EXTENDED RELEASE ORAL at 08:22

## 2022-08-17 NOTE — PROGRESS NOTES
PROGRESS NOTE         Patient Identification:  Name:  Sridevi Napier  Age:  52 y.o.  Sex:  female  :  1970  MRN:  [id number]  Visit Number:  [id number]  Primary Care Provider:  Sebastian Ellison PA         LOS: 1 day       ----------------------------------------------------------------------------------------------------------------------  Subjective       Chief Complaints:    Shortness of Breath        Interval History:      The patient is sitting up in bed on room air in no apparent distress, which is improved from yesterday.  Reports that she feels very well this morning and both headache and myalgias have resolved.  Cough has become much less frequent.  Denies any shortness of breath this morning.  Afebrile, no diarrhea.  Minimal expiratory wheezing bilaterally.  CRP is improved at 0.66.  WBC remains normal at 5.38.  Blood cultures on 8/15/2022 are so far showing no growth at 24 hours.    Review of Systems:    Constitutional: no fever, chills and night sweats.  No fatigue.  Eyes: no eye drainage, itching or redness.  HEENT: no mouth sores, dysphagia or nose bleed.  Respiratory: no for shortness of breath.  Minimal, nonproductive cough.  Cardiovascular: no chest pain, no palpitations, no orthopnea.  Gastrointestinal: no nausea, vomiting or diarrhea. No abdominal pain, hematemesis or rectal bleeding.  Genitourinary: no dysuria or polyuria.  Hematologic/lymphatic: no lymph node abnormalities, no easy bruising or easy bleeding.  Musculoskeletal: no muscle or joint pain.  Skin: No rash and no itching.  Neurological: no loss of consciousness, no seizure, no headache.  Behavioral/Psych: no depression or suicidal ideation.  Endocrine: no hot flashes.  Immunologic: negative.    ----------------------------------------------------------------------------------------------------------------------      Objective       Current Hospital Meds:  aspirin, 81 mg, Oral, Daily  budesonide-formoterol, 2 puff,  Inhalation, BID - RT  cetirizine, 10 mg, Oral, Daily  dexamethasone, 6 mg, Intravenous, Daily  enoxaparin, 40 mg, Subcutaneous, BID  furosemide, 20 mg, Intravenous, Once  Insulin Aspart, 0-7 Units, Subcutaneous, TID AC  losartan, 50 mg, Oral, Q24H  metoprolol succinate XL, 12.5 mg, Oral, Q24H  pantoprazole, 40 mg, Oral, Q AM  remdesivir, 100 mg, Intravenous, Q24H  sodium chloride, 10 mL, Intravenous, Q12H      Pharmacy Consult - Pharmacy to dose,   Pharmacy Consult - Remdesivir,       ----------------------------------------------------------------------------------------------------------------------    Vital Signs:  Temp:  [97.5 °F (36.4 °C)-98.5 °F (36.9 °C)] 98.5 °F (36.9 °C)  Heart Rate:  [72-98] 76  Resp:  [18-20] 18  BP: (145-175)/(75-90) 151/88  No data found.  SpO2 Percentage    08/17/22 0300 08/17/22 0600 08/17/22 0726   SpO2: 95% 94% 95%     SpO2:  [93 %-95 %] 95 %  on  Flow (L/min):  [2] 2;   Device (Oxygen Therapy): room air    Body mass index is 48.89 kg/m².  Wt Readings from Last 3 Encounters:   08/17/22 121 kg (267 lb 4.8 oz)   01/16/20 113 kg (250 lb)        Intake/Output Summary (Last 24 hours) at 8/17/2022 1336  Last data filed at 8/17/2022 1000  Gross per 24 hour   Intake 990 ml   Output --   Net 990 ml     Diet Regular; Cardiac  ----------------------------------------------------------------------------------------------------------------------      Physical Exam:    Constitutional:  female is sitting up in bed on room air in no apparent distress.  BMI 48.89.  HENT:  Head: Normocephalic and atraumatic.  Mouth:  Moist mucous membranes.    Eyes:  Conjunctivae and EOM are normal.  No scleral icterus.  Neck:  Neck supple.  No JVD present.    Cardiovascular:  Normal rate, regular rhythm and normal heart sounds with no murmur. No edema.  Pulmonary/Chest:  No respiratory distress, no rhonchi, no crackles, with mild expiratory wheezing.  Abdominal:  Soft.  Bowel sounds are normal.  No  distension and no tenderness.   Musculoskeletal:  No edema, no tenderness, and no deformity.  No swelling or redness of joints.  Neurological:  Alert and oriented to person, place, and time.  No facial droop.  No slurred speech.   Skin:  Skin is warm and dry.  No rash noted.  No pallor.   Psychiatric:  Normal mood and affect.  Behavior is normal.       ----------------------------------------------------------------------------------------------------------------------  Results from last 7 days   Lab Units 08/16/22  0403 08/15/22  2220   CK TOTAL U/L 80  --    TROPONIN T ng/mL  --  <0.010     Results from last 7 days   Lab Units 08/15/22  2220   PROBNP pg/mL 21.5       Results from last 7 days   Lab Units 08/15/22  2237   PH, ARTERIAL pH units 7.426   PO2 ART mm Hg 59.4*   PCO2, ARTERIAL mm Hg 35.5   HCO3 ART mmol/L 23.3     Results from last 7 days   Lab Units 08/17/22  0242 08/16/22  0403 08/15/22  2220   CRP mg/dL 0.66* 0.84* 0.54*   LACTATE mmol/L  --   --  1.7   WBC 10*3/mm3 5.38 5.12 5.96   HEMOGLOBIN g/dL 13.1 13.5 13.4   HEMATOCRIT % 41.2 42.2 40.6   MCV fL 92.0 90.4 88.6   MCHC g/dL 31.8 32.0 33.0   PLATELETS 10*3/mm3 320 343 337   INR   --   --  0.95     Results from last 7 days   Lab Units 08/17/22  0242 08/16/22  0403 08/15/22  2220   SODIUM mmol/L 142 142 141   POTASSIUM mmol/L 3.9 4.3 4.1   MAGNESIUM mg/dL 2.4  --  2.1   CHLORIDE mmol/L 107 105 105   CO2 mmol/L 24.0 23.1 23.8   BUN mg/dL 18 14 17   CREATININE mg/dL 0.77 0.83 0.83   CALCIUM mg/dL 9.2 9.2 9.2   GLUCOSE mg/dL 131* 190* 115*   ALBUMIN g/dL 4.09 4.61 4.43   BILIRUBIN mg/dL <0.2 0.2 <0.2   ALK PHOS U/L 85 93 90   AST (SGOT) U/L 21 20 18   ALT (SGPT) U/L 23 21 19   Estimated Creatinine Clearance: 103.5 mL/min (by C-G formula based on SCr of 0.77 mg/dL).  No results found for: AMMONIA    Hemoglobin A1C   Date/Time Value Ref Range Status   08/16/2022 0403 5.70 (H) 4.80 - 5.60 % Final     Glucose   Date/Time Value Ref Range Status   08/17/2022  1057 124 70 - 130 mg/dL Final     Comment:     Meter: [id number] : [id number] KYLEIGH FELIPE   08/17/2022 0647 106 70 - 130 mg/dL Final     Comment:     Meter: [id number] : [id number] CAMRYN HUGO   08/16/2022 2125 118 70 - 130 mg/dL Final     Comment:     Meter: [id number] : [id number] BAILEY ESPINOZA   08/16/2022 1608 188 (H) 70 - 130 mg/dL Final     Comment:     Meter: [id number] : [id number] SANCHEZJOVAN BUSH   08/16/2022 1122 135 (H) 70 - 130 mg/dL Final     Comment:     Meter: [id number] : [id number] SANCHEZ BUSH   08/16/2022 0859 176 (H) 70 - 130 mg/dL Final     Comment:     Meter: [id number] : [id number] SANCHEZJOVAN BUSH     Lab Results   Component Value Date    HGBA1C 5.70 (H) 08/16/2022     Lab Results   Component Value Date    TSH 0.769 08/15/2022       Blood Culture   Date Value Ref Range Status   08/15/2022 No growth at 24 hours  Preliminary   08/15/2022 No growth at 24 hours  Preliminary     No results found for: URINECX  No results found for: WOUNDCX  No results found for: STOOLCX  No results found for: RESPCX  Pain Management Panel    There is no flowsheet data to display.           ----------------------------------------------------------------------------------------------------------------------  Imaging Results (Last 24 Hours)     ** No results found for the last 24 hours. **        Acute hypoxic respiratory failure secondary to COVID-19 infection     The patient is sitting up in bed on 2 L nasal cannula in no apparent distress, but is on room air at baseline.  Clinically, she is feeling much better today and only has a mildly productive cough, myalgias, and headache.  Lungs are clear to auscultation bilaterally and physical exam is otherwise fairly unremarkable.  As CRP is only mildly elevated, procalcitonin remains normal, and imaging with chest x-ray and CT chest show no evidence of pneumonia, we do not believe patient has superimposed bacterial pneumonia at this time but  rather simple COVID-19 infection.     We are agreeable with Decadron and remdesivir, as patient is requiring supplemental oxygen and is typically on room air.     Would recommend to hold antibiotic therapy at this time as patient is presenting with a typical COVID-19 infection without evidence of superimposed bacterial pneumonia.  We will follow closely and adjust therapy as appropriate.     Pertinent Infectious Disease Results     Urinalysis on 8/16/2022 was negative.  CT chest on 8/16/2022 showed bilateral lower lobe atelectasis, left greater than right.  Mild hyperinflation of the right middle lobe suggesting air trapping.  No evidence of acute pneumonia.  Chest x-ray on 8/15/2022 was unremarkable.  COVID-19 and flu A/B PCR on 8/15/2022 detected COVID-19.       CRP is improved at 0.66.  WBC remains normal at 5.38.  Blood cultures on 8/15/2022 are so far showing no growth at 24 hours.    ----------------------------------------------------------------------------------------------------------------------    Assessment/Plan       Acute hypoxic respiratory failure secondary to COVID-19 infection    The patient is no longer requiring supplemental oxygen and reports that she is not short of breath and cough has significantly improved today.  Other symptoms including myalgias and headache have also resolved today.  Lungs with minimal expiratory wheezing.  Afebrile, no diarrhea.  CRP is nearly normalized at 0.66 and white count remains normal.  Based on inflammatory markers as well as chest x-ray and CT chest which showed no evidence of pneumonia, we do not believe patient has superimposed bacterial pneumonia at this time.  Believe patient has simple COVID-19 infection.    At least a 3-day course of remdesivir would be appropriate.  Decadron for COVID-19 purposes may be tapered off since patient is currently on room air.  Recommend hold antibiotic therapy due to lack of superimposed bacterial pneumonia.  Patient seems  to have shown significant improvement from COVID-19 standpoint.    Code Status:   Code Status and Medical Interventions:   Ordered at: 08/16/22 0257     Level Of Support Discussed With:    Patient     Code Status (Patient has no pulse and is not breathing):    CPR (Attempt to Resuscitate)     Medical Interventions (Patient has pulse or is breathing):    Full Support       Kristen Sherman PA-C  08/17/22  13:36 EDT

## 2022-08-17 NOTE — PROGRESS NOTES
[facility] HOSPITALIST PROGRESS NOTE     Patient Identification:  Name:  Sridevi Napier  Age:  52 y.o.  Sex:  female  :  1970  MRN:  [id number]  Visit Number:  [id number]  ROOM: [address] [place] [address]     Primary Care Provider:  Sebastian Ellison PA    Length of stay:  1    Subjective        Chief Compliant Follow up for hypoxia and COVID 19    Patient seen and examined this morning via telemedicine video.  Patient very comfortable and sitting on the bed.  States that shortness of breath is resolved at rest. ASENCIO improved. Denies any pedal edema.  Denies any nausea vomiting or pain.  Has mild tickling causing cough.  Afebrile no events overnight.  On RA.        Objective     Current Hospital Meds:aspirin, 81 mg, Oral, Daily  budesonide-formoterol, 2 puff, Inhalation, BID - RT  cetirizine, 10 mg, Oral, Daily  dexamethasone, 6 mg, Intravenous, Daily  enoxaparin, 40 mg, Subcutaneous, BID  Insulin Aspart, 0-7 Units, Subcutaneous, TID AC  losartan, 50 mg, Oral, Q24H  metoprolol succinate XL, 12.5 mg, Oral, Q24H  pantoprazole, 40 mg, Oral, Q AM  remdesivir, 100 mg, Intravenous, Q24H  sodium chloride, 10 mL, Intravenous, Q12H    Pharmacy Consult - Pharmacy to dose,   Pharmacy Consult - Remdesivir,       ----------------------------------------------------------------------------------------------------------------------  Vital Signs:  Temp:  [98.2 °F (36.8 °C)-98.5 °F (36.9 °C)] 98.2 °F (36.8 °C)  Heart Rate:  [69-80] 69  Resp:  [18-20] 18  BP: (131-151)/(75-89) 131/89  SpO2:  [94 %-95 %] 94 %  on  Flow (L/min):  [2] 2;   Device (Oxygen Therapy): room air  Body mass index is 48.89 kg/m².    Wt Readings from Last 3 Encounters:   22 121 kg (267 lb 4.8 oz)   20 113 kg (250 lb)       Intake/Output Summary (Last 24 hours) at 2022 1922  Last data filed at 2022 1400  Gross per 24 hour   Intake 1230 ml   Output --   Net 1230 ml     Diet Regular;  Cardiac  ----------------------------------------------------------------------------------------------------------------------  Physical exam:  This physical exam has been personally performed remotely in the unit aided by real-time audio/visual communication tools. Nursing present at bedside during this exam and assisted during exam. The use of a video visit has been reviewed with the patient and verbal informed consent has been obtained.     Physical Exam:  General: Patient appears awake, alert, and in no acute distress.  Head: Normocephalic, atraumatic  Eyes: EOMI. Conjunctivae and sclerae normal.  Ears: Ears appear intact with no abnormalities noted.   Neck: Trachea midline. No obvious JVD.  Heart: Tele reveals sinus rhythm  Lungs: Respirations appear to be regular, even and unlabored with no signs of respiratory distress. No audible wheezing.  Abdomen: No obvious abdominal distension.  MS: Muscle tone appears normal. No gross deformities.  Extremities: No clubbing, cyanosis or edema noted.  Skin: No visible bleeding, bruising, or rash.  Neurologic: Alert and oriented x3. No gross focal deficits.       ----------------------------------------------------------------------------------------------------------------------  ----------------------------------------------------------------------------------------------------------------------  Results from last 7 days   Lab Units 08/17/22  0242 08/16/22  0403 08/15/22  2220   CRP mg/dL 0.66* 0.84* 0.54*   LACTATE mmol/L  --   --  1.7   WBC 10*3/mm3 5.38 5.12 5.96   HEMOGLOBIN g/dL 13.1 13.5 13.4   HEMATOCRIT % 41.2 42.2 40.6   MCV fL 92.0 90.4 88.6   MCHC g/dL 31.8 32.0 33.0   PLATELETS 10*3/mm3 320 343 337   INR   --   --  0.95     Results from last 7 days   Lab Units 08/17/22  0242 08/16/22  0403 08/15/22  2220   SODIUM mmol/L 142 142 141   POTASSIUM mmol/L 3.9 4.3 4.1   MAGNESIUM mg/dL 2.4  --  2.1   CHLORIDE mmol/L 107 105 105   CO2 mmol/L 24.0 23.1 23.8   BUN  mg/dL 18 14 17   CREATININE mg/dL 0.77 0.83 0.83   CALCIUM mg/dL 9.2 9.2 9.2   GLUCOSE mg/dL 131* 190* 115*   ALBUMIN g/dL 4.09 4.61 4.43   BILIRUBIN mg/dL <0.2 0.2 <0.2   ALK PHOS U/L 85 93 90   AST (SGOT) U/L 21 20 18   ALT (SGPT) U/L 23 21 19   Estimated Creatinine Clearance: 103.5 mL/min (by C-G formula based on SCr of 0.77 mg/dL).  Results from last 7 days   Lab Units 08/16/22  0403 08/15/22  2220   CK TOTAL U/L 80  --    TROPONIN T ng/mL  --  <0.010     Hemoglobin A1C   Date/Time Value Ref Range Status   08/16/2022 0403 5.70 (H) 4.80 - 5.60 % Final     Glucose   Date/Time Value Ref Range Status   08/17/2022 1608 169 (H) 70 - 130 mg/dL Final     Comment:     Meter: [id number] : [id number] KYLEIGH FELIPE   08/17/2022 1057 124 70 - 130 mg/dL Final     Comment:     Meter: [id number] : [id number] KYLEIGH FELIPE   08/17/2022 0647 106 70 - 130 mg/dL Final     Comment:     Meter: [id number] : [id number] CAMRYN HUGO   08/16/2022 2125 118 70 - 130 mg/dL Final     Comment:     Meter: [id number] : [id number] BAILEY ESPINOZA   08/16/2022 1608 188 (H) 70 - 130 mg/dL Final     Comment:     Meter: [id number] : [id number] SANCHEZ BUSH   08/16/2022 1122 135 (H) 70 - 130 mg/dL Final     Comment:     Meter: [id number] : [id number] SANCHEZ BUSH   08/16/2022 0859 176 (H) 70 - 130 mg/dL Final     Comment:     Meter: [id number] : [id number] SANCHEZ BUSH     No results found for: AMMONIA    Results from last 7 days   Lab Units 08/16/22  0014   NITRITE UA  Negative     No results found for: BLOODCXNo results found for: RESPCXNo results found for: URINECXNo results found for: WOUNDCXNo results found for: BODYFLDCXNo results found for: STOOLCX  I have personally looked at the labs and they are summarized above.  ----------------------------------------------------------------------------------------------------------------------  Imaging Results (Last 24 Hours)     ** No results found for the last 24 hours.  **        I have personally reviewed the radiology images and read the final radiology report.    Assessment & Plan      Assessment:  Mild acute COVID 19 infection with no evidence of Pneumonia on CT chest  Acute hypoxic respiratory failure secondary to COVID 19  Probable COPD  Pre-diabetes A1C 5.7  Hyperglycemia secondary to steroids  Essential HTN  Morbid Obesity, BMI 49.09      Plan:  For mild COVID-19 infection, on remdesivir for 3 days.  Currently patient is afebrile and VSS.  No leukocytosis and CRP minimally elevated.  COVID-19 labs including ferritin D-dimer normal.  CT chest showed Bilateral lower lobe atelectasis, left greater than right. Mild hyperinflation of the right middle lobe suggesting air trapping. No evidence of acute pneumonia.     Acute hypoxic respiratory failure secondary to mild COVID 19 infection, resolved. on IV Decadron.  We will continue for total 10 days.  We will repeat a dose of IV Lasix to improve lung compliance. 2D echo pending.  We will do walking oximetry before discharge to see if patient needs home O2.    Probable COPD vs asthma, clinically.  Patient responded to inhaled steroids and albuterol inhaler.  The patient has never had PFT done or evaluated for asthma.  Advised to have outpatient PFT done after discharge.     Pre-diabetes A1C 5.7. Hyperglycemia secondary to steroids.  on low-dose sliding scale.  Monitor CBG.    Essential HTN, takes olmesartan and HCTZ at home. on losartan and on Toprol XL low-dose.  Monitor and adjust.    Lovenox subcu for DVT prophylaxis.  Protonix for GI prophylaxis.  Ambulating.    Management and plans discussed in detail with patient and nursing.      The patient is high risk due to the following diagnoses/reasons:  Mild acute COVID 19 infection with no evidence of Pneumonia on CT chest  Acute hypoxic respiratory failure secondary to COVID 19    Disposition Home in am.    Nevin Stewart MD  08/17/22  19:22 EDT

## 2022-08-17 NOTE — PLAN OF CARE
Goal Outcome Evaluation:              Outcome Evaluation: Pt is resting in bed at this time. Pt has ambulated in room multiple times throughout the shift. Pt voices no complaints or concerns at this time. Will continue to monitor.

## 2022-08-17 NOTE — PLAN OF CARE
Goal Outcome Evaluation:  Plan of Care Reviewed With: patient        Progress: improving  Outcome Evaluation: Pt resting in bed. Vital signs stable. No acute changes. Will continue to monitor.

## 2022-08-18 ENCOUNTER — READMISSION MANAGEMENT (OUTPATIENT)
Dept: CALL CENTER | Facility: HOSPITAL | Age: 52
End: 2022-08-18

## 2022-08-18 ENCOUNTER — APPOINTMENT (OUTPATIENT)
Dept: CARDIOLOGY | Facility: HOSPITAL | Age: 52
End: 2022-08-18

## 2022-08-18 VITALS
TEMPERATURE: 98.3 F | HEIGHT: 62 IN | BODY MASS INDEX: 48.69 KG/M2 | OXYGEN SATURATION: 97 % | SYSTOLIC BLOOD PRESSURE: 135 MMHG | DIASTOLIC BLOOD PRESSURE: 81 MMHG | WEIGHT: 264.6 LBS | HEART RATE: 63 BPM | RESPIRATION RATE: 18 BRPM

## 2022-08-18 LAB
ALBUMIN SERPL-MCNC: 3.78 G/DL (ref 3.5–5.2)
ALBUMIN/GLOB SERPL: 1.5 G/DL
ALP SERPL-CCNC: 82 U/L (ref 39–117)
ALT SERPL W P-5'-P-CCNC: 20 U/L (ref 1–33)
ANION GAP SERPL CALCULATED.3IONS-SCNC: 9.4 MMOL/L (ref 5–15)
AST SERPL-CCNC: 22 U/L (ref 1–32)
BASOPHILS # BLD AUTO: 0.02 10*3/MM3 (ref 0–0.2)
BASOPHILS NFR BLD AUTO: 0.4 % (ref 0–1.5)
BH CV ECHO MEAS - AO ROOT DIAM: 2.6 CM
BH CV ECHO MEAS - EDV(CUBED): 103.8 ML
BH CV ECHO MEAS - EDV(MOD-SP4): 63.6 ML
BH CV ECHO MEAS - EF(MOD-SP4): 72 %
BH CV ECHO MEAS - ESV(CUBED): 74.1 ML
BH CV ECHO MEAS - ESV(MOD-SP4): 17.8 ML
BH CV ECHO MEAS - FS: 10.6 %
BH CV ECHO MEAS - IVS/LVPW: 0.92 CM
BH CV ECHO MEAS - IVSD: 1.1 CM
BH CV ECHO MEAS - LA DIMENSION: 3.1 CM
BH CV ECHO MEAS - LAT PEAK E' VEL: 10 CM/SEC
BH CV ECHO MEAS - LV DIASTOLIC VOL/BSA (35-75): 29.6 CM2
BH CV ECHO MEAS - LV MASS(C)D: 199.6 GRAMS
BH CV ECHO MEAS - LV SYSTOLIC VOL/BSA (12-30): 8.3 CM2
BH CV ECHO MEAS - LVIDD: 4.7 CM
BH CV ECHO MEAS - LVIDS: 4.2 CM
BH CV ECHO MEAS - LVOT AREA: 3.1 CM2
BH CV ECHO MEAS - LVOT DIAM: 2 CM
BH CV ECHO MEAS - LVPWD: 1.2 CM
BH CV ECHO MEAS - MED PEAK E' VEL: 7.3 CM/SEC
BH CV ECHO MEAS - MV A MAX VEL: 85.3 CM/SEC
BH CV ECHO MEAS - MV E MAX VEL: 96.4 CM/SEC
BH CV ECHO MEAS - MV E/A: 1.13
BH CV ECHO MEAS - PA ACC TIME: 0.12 SEC
BH CV ECHO MEAS - PA PR(ACCEL): 26.8 MMHG
BH CV ECHO MEAS - RAP SYSTOLE: 10 MMHG
BH CV ECHO MEAS - RVSP: 34.2 MMHG
BH CV ECHO MEAS - SI(MOD-SP4): 21.3 ML/M2
BH CV ECHO MEAS - SV(MOD-SP4): 45.8 ML
BH CV ECHO MEAS - TAPSE (>1.6): 1.47 CM
BH CV ECHO MEAS - TR MAX PG: 24.2 MMHG
BH CV ECHO MEAS - TR MAX VEL: 246 CM/SEC
BH CV ECHO MEASUREMENTS AVERAGE E/E' RATIO: 11.14
BILIRUB SERPL-MCNC: <0.2 MG/DL (ref 0–1.2)
BUN SERPL-MCNC: 18 MG/DL (ref 6–20)
BUN/CREAT SERPL: 19.6 (ref 7–25)
CALCIUM SPEC-SCNC: 8.7 MG/DL (ref 8.6–10.5)
CHLORIDE SERPL-SCNC: 103 MMOL/L (ref 98–107)
CO2 SERPL-SCNC: 25.6 MMOL/L (ref 22–29)
CREAT SERPL-MCNC: 0.92 MG/DL (ref 0.57–1)
DEPRECATED RDW RBC AUTO: 48.4 FL (ref 37–54)
EGFRCR SERPLBLD CKD-EPI 2021: 74.1 ML/MIN/1.73
EOSINOPHIL # BLD AUTO: 0 10*3/MM3 (ref 0–0.4)
EOSINOPHIL NFR BLD AUTO: 0 % (ref 0.3–6.2)
ERYTHROCYTE [DISTWIDTH] IN BLOOD BY AUTOMATED COUNT: 14.3 % (ref 12.3–15.4)
GLOBULIN UR ELPH-MCNC: 2.5 GM/DL
GLUCOSE BLDC GLUCOMTR-MCNC: 96 MG/DL (ref 70–130)
GLUCOSE SERPL-MCNC: 111 MG/DL (ref 65–99)
HCT VFR BLD AUTO: 42.5 % (ref 34–46.6)
HGB BLD-MCNC: 13.2 G/DL (ref 12–15.9)
IMM GRANULOCYTES # BLD AUTO: 0.03 10*3/MM3 (ref 0–0.05)
IMM GRANULOCYTES NFR BLD AUTO: 0.6 % (ref 0–0.5)
LEFT ATRIUM VOLUME INDEX: 22.8 ML/M2
LYMPHOCYTES # BLD AUTO: 1.74 10*3/MM3 (ref 0.7–3.1)
LYMPHOCYTES NFR BLD AUTO: 33.9 % (ref 19.6–45.3)
MAXIMAL PREDICTED HEART RATE: 166 BPM
MCH RBC QN AUTO: 28.5 PG (ref 26.6–33)
MCHC RBC AUTO-ENTMCNC: 31.1 G/DL (ref 31.5–35.7)
MCV RBC AUTO: 91.8 FL (ref 79–97)
MONOCYTES # BLD AUTO: 0.78 10*3/MM3 (ref 0.1–0.9)
MONOCYTES NFR BLD AUTO: 15.2 % (ref 5–12)
NEUTROPHILS NFR BLD AUTO: 2.57 10*3/MM3 (ref 1.7–7)
NEUTROPHILS NFR BLD AUTO: 49.9 % (ref 42.7–76)
NRBC BLD AUTO-RTO: 0 /100 WBC (ref 0–0.2)
PLATELET # BLD AUTO: 305 10*3/MM3 (ref 140–450)
PMV BLD AUTO: 10.6 FL (ref 6–12)
POTASSIUM SERPL-SCNC: 3.9 MMOL/L (ref 3.5–5.2)
PROT SERPL-MCNC: 6.3 G/DL (ref 6–8.5)
RBC # BLD AUTO: 4.63 10*6/MM3 (ref 3.77–5.28)
SODIUM SERPL-SCNC: 138 MMOL/L (ref 136–145)
STRESS TARGET HR: 141 BPM
WBC NRBC COR # BLD: 5.14 10*3/MM3 (ref 3.4–10.8)

## 2022-08-18 PROCEDURE — 82962 GLUCOSE BLOOD TEST: CPT

## 2022-08-18 PROCEDURE — 25010000002 ENOXAPARIN PER 10 MG

## 2022-08-18 PROCEDURE — 80053 COMPREHEN METABOLIC PANEL: CPT | Performed by: HOSPITALIST

## 2022-08-18 PROCEDURE — 94799 UNLISTED PULMONARY SVC/PX: CPT

## 2022-08-18 PROCEDURE — 94761 N-INVAS EAR/PLS OXIMETRY MLT: CPT

## 2022-08-18 PROCEDURE — 93306 TTE W/DOPPLER COMPLETE: CPT | Performed by: SPECIALIST

## 2022-08-18 PROCEDURE — 25010000002 DEXAMETHASONE PER 1 MG: Performed by: HOSPITALIST

## 2022-08-18 PROCEDURE — 96372 THER/PROPH/DIAG INJ SC/IM: CPT

## 2022-08-18 PROCEDURE — 96376 TX/PRO/DX INJ SAME DRUG ADON: CPT

## 2022-08-18 PROCEDURE — G0378 HOSPITAL OBSERVATION PER HR: HCPCS

## 2022-08-18 PROCEDURE — 93306 TTE W/DOPPLER COMPLETE: CPT

## 2022-08-18 PROCEDURE — 99217 PR OBSERVATION CARE DISCHARGE MANAGEMENT: CPT | Performed by: HOSPITALIST

## 2022-08-18 PROCEDURE — 96365 THER/PROPH/DIAG IV INF INIT: CPT

## 2022-08-18 PROCEDURE — 25010000002 REMDESIVIR 100 MG RECONSTITUTED SOLUTION: Performed by: HOSPITALIST

## 2022-08-18 PROCEDURE — 85025 COMPLETE CBC W/AUTO DIFF WBC: CPT | Performed by: HOSPITALIST

## 2022-08-18 RX ORDER — FAMOTIDINE 40 MG/1
40 TABLET, FILM COATED ORAL DAILY
Qty: 10 TABLET | Refills: 0 | Status: SHIPPED | OUTPATIENT
Start: 2022-08-19 | End: 2022-08-29

## 2022-08-18 RX ORDER — DEXAMETHASONE 6 MG/1
6 TABLET ORAL
Qty: 6 TABLET | Refills: 0 | Status: SHIPPED | OUTPATIENT
Start: 2022-08-19 | End: 2022-08-25

## 2022-08-18 RX ADMIN — PANTOPRAZOLE SODIUM 40 MG: 40 TABLET, DELAYED RELEASE ORAL at 05:09

## 2022-08-18 RX ADMIN — BUDESONIDE AND FORMOTEROL FUMARATE DIHYDRATE 2 PUFF: 160; 4.5 AEROSOL RESPIRATORY (INHALATION) at 07:30

## 2022-08-18 RX ADMIN — METOPROLOL SUCCINATE 12.5 MG: 25 TABLET, EXTENDED RELEASE ORAL at 08:10

## 2022-08-18 RX ADMIN — ASPIRIN 81 MG: 81 TABLET, COATED ORAL at 08:10

## 2022-08-18 RX ADMIN — DEXAMETHASONE SODIUM PHOSPHATE 6 MG: 4 INJECTION, SOLUTION INTRA-ARTICULAR; INTRALESIONAL; INTRAMUSCULAR; INTRAVENOUS; SOFT TISSUE at 08:10

## 2022-08-18 RX ADMIN — LOSARTAN POTASSIUM 50 MG: 50 TABLET, FILM COATED ORAL at 08:10

## 2022-08-18 RX ADMIN — REMDESIVIR 100 MG: 100 INJECTION, POWDER, LYOPHILIZED, FOR SOLUTION INTRAVENOUS at 03:57

## 2022-08-18 RX ADMIN — CETIRIZINE HYDROCHLORIDE 10 MG: 10 TABLET, FILM COATED ORAL at 08:10

## 2022-08-18 RX ADMIN — Medication 10 ML: at 08:11

## 2022-08-18 RX ADMIN — ALBUTEROL SULFATE 2 PUFF: 90 AEROSOL, METERED RESPIRATORY (INHALATION) at 07:30

## 2022-08-18 RX ADMIN — ENOXAPARIN SODIUM 40 MG: 40 INJECTION SUBCUTANEOUS at 08:11

## 2022-08-18 NOTE — DISCHARGE SUMMARY
[facility] HOSPITALISTS DISCHARGE SUMMARY    Patient Identification:  Name:  Sridevi Napier  Age:  52 y.o.  Sex:  female  :  1970  MRN:  [id number]  Visit Number:  [id number]    Date of Admission: 8/15/2022  Date of Discharge:  2022     PCP: Sebastian Ellison PA      DISCHARGE DIAGNOSIS  Mild acute COVID 19 infection with no evidence of Pneumonia on CT chest  Acute hypoxic respiratory failure secondary to COVID 19, resolved  Probable COPD  Pre-diabetes A1C 5.7  Hyperglycemia secondary to steroids  Essential HTN  Morbid Obesity, BMI 49.09    CONSULTS   ID    PROCEDURES PERFORMED  None    HOSPITAL COURSE  Ms. Napier is a 52 y.o. female with history of HTN, fibromyalgia, Probable COPD presented to [facility] complaining of shortness of breath.  Please see the admitting history and physical for further details.  Admitted with Mild acute COVID 19 infection with no evidence of Pneumonia on CT chest and Acute hypoxic respiratory failure secondary to COVID 19.  Patient was started on remdesivir for 3 doses and given IV Lasix to improve lung compliance.  Hypoxia resolved.  Ambulating and did not desaturate.  Remained afebrile and vitally stable.  Regarding the unspecified respiratory illness, probable COPD vs asthma, clinically.  Patient responded to inhaled steroids and albuterol inhaler.  The patient has never had PFT done or evaluated for asthma.  Advised to have outpatient PFT done after discharge.  Patient improved symptomatically significantly and would like to go home.  Since patient is vitally stable, improved symptomatically and would like to go home, it was decided to discharge patient to home.  Discharge disposition stable.      VITAL SIGNS:  Temp:  [98.3 °F (36.8 °C)-98.5 °F (36.9 °C)] 98.3 °F (36.8 °C)  Heart Rate:  [57-81] 63  Resp:  [18] 18  BP: (120-135)/(75-81) 135/81  SpO2:  [96 %-97 %] 97 %  on   ;   Device (Oxygen Therapy): room air    Body mass index is 48.4  kg/m².  Wt Readings from Last 3 Encounters:   08/18/22 120 kg (264 lb 9.6 oz)   01/16/20 113 kg (250 lb)       PHYSICAL EXAM:  This physical exam has been personally performed remotely in the unit aided by real-time audio/visual communication tools. Nursing present at bedside during this exam and assisted during exam. The use of a video visit has been reviewed with the patient and verbal informed consent has been obtained.      Physical Exam:  General: Patient appears awake, alert, and in no acute distress.  Head: Normocephalic, atraumatic  Eyes: EOMI. Conjunctivae and sclerae normal.  Ears: Ears appear intact with no abnormalities noted.   Neck: Trachea midline. No obvious JVD.  Heart: Tele reveals sinus rhythm  Lungs: Respirations appear to be regular, even and unlabored with no signs of respiratory distress. No audible wheezing.  Abdomen: No obvious abdominal distension.  MS: Muscle tone appears normal. No gross deformities.  Extremities: No clubbing, cyanosis or edema noted.  Skin: No visible bleeding, bruising, or rash.  Neurologic: Alert and oriented x3. No gross focal deficits.     DISCHARGE DISPOSITION   Home    DISCHARGE MEDICATIONS:     Discharge Medications      New Medications      Instructions Start Date   dexamethasone 6 MG tablet  Commonly known as: DECADRON   6 mg, Oral, Daily With Breakfast   Start Date: August 19, 2022     famotidine 40 MG tablet  Commonly known as: Pepcid   40 mg, Oral, Daily   Start Date: August 19, 2022        Continue These Medications      Instructions Start Date   albuterol sulfate  (90 Base) MCG/ACT inhaler  Commonly known as: PROVENTIL HFA;VENTOLIN HFA;PROAIR HFA   1-2 puffs, Inhalation, Every 4 Hours PRN      amitriptyline 10 MG tablet  Commonly known as: ELAVIL   10 mg, Oral, Nightly PRN      aspirin 81 MG EC tablet   81 mg, Oral, Daily      cetirizine 10 MG tablet  Commonly known as: zyrTEC   10 mg, Oral, Daily      Fluticasone-Salmeterol(sensor) 232-14 MCG/ACT  aerosol powder    1 puff, Inhalation, 2 Times Daily      hydroCHLOROthiazide 25 MG tablet  Commonly known as: HYDRODIURIL   25 mg, Oral, Daily      olmesartan 40 MG tablet  Commonly known as: BENICAR   40 mg, Oral, Daily             Diet Instructions     Diet: Cardiac      Discharge Diet: Cardiac        Activity Instructions     Activity as Tolerated     Additional Activity Instructions:    Please continue to isolate at home as instructed by doctor, CDC guidelines and / or the health department.         No future appointments.  Your Scheduled Appointments    A follow up appointment has been scheduled for you to see ROSSY Quezada  on August 29, 2022 at 10:20 am.  You can reach the office at .         Additional Instructions for the Follow-ups that You Need to Schedule     Discharge Follow-up with PCP   As directed       Currently Documented PCP:    Sebastian Ellison PA    PCP Phone Number:    [phone]     Follow Up Details: Within 1 week            Follow-up Information     Sebastian Ellison PA .    Specialty: Physician Assistant  Why: Within 1 week  Contact information:  [facility] [place] [address] [phone]                          TEST  RESULTS PENDING AT DISCHARGE  Pending Labs     Order Current Status    Blood Culture - Blood, Arm, Right Preliminary result    Blood Culture - Blood, Hand, Left Preliminary result           CODE STATUS  Code Status and Medical Interventions:   Ordered at: 08/16/22 0257     Level Of Support Discussed With:    Patient     Code Status (Patient has no pulse and is not breathing):    CPR (Attempt to Resuscitate)     Medical Interventions (Patient has pulse or is breathing):    Full Support       The ASCVD Risk score (Felicity ARIAN Jr., et al., 2013) failed to calculate for the following reasons:    Cannot find a previous HDL lab    Cannot find a previous total cholesterol lab     Nevin Stewart MD  08/18/22  19:56 EDT         Please note that this  discharge summary required less than 30 minutes to complete.    Please send a copy of this dictation to the following providers:  Sebastian Ellison PA

## 2022-08-18 NOTE — PLAN OF CARE
Goal Outcome Evaluation:  Plan of Care Reviewed With: patient        Progress: improving  Outcome Evaluation: Pt resting in bed. Vital signs stable. Pt on room air, tolerating well. No acute changes. Will continue to monitor.

## 2022-08-18 NOTE — DISCHARGE INSTR - APPOINTMENTS
A follow up appointment has been scheduled for you to see ROSSY Quezada  on August 29, 2022 at 10:20 am.  You can reach the office at .

## 2022-08-18 NOTE — DISCHARGE INSTR - ACTIVITY
Please continue to isolate at home as instructed by doctor, CDC guidelines and / or the health department.

## 2022-08-19 ENCOUNTER — READMISSION MANAGEMENT (OUTPATIENT)
Dept: CALL CENTER | Facility: HOSPITAL | Age: 52
End: 2022-08-19

## 2022-08-19 NOTE — OUTREACH NOTE
Prep Survey    Flowsheet Row Responses   [facility]   Is LACE score < 7 ? Yes   Emergency Room discharge w/ pulse ox? No   Eligibility Readm Mgmt   Discharge diagnosis Acute hypoxic respiratory failure secondary to COVID 19   Does the patient have one of the following disease processes/diagnoses(primary or secondary)? COVID-19   Does the patient have Home health ordered? No   Is there a DME ordered? No   Prep survey completed? Yes          ADI GOLD - Registered Nurse

## 2022-08-19 NOTE — OUTREACH NOTE
COVID-19 Week 1 Survey    Flowsheet Row Responses   [facility] [place] [facility]? [facility]   Does the patient have one of the following disease processes/diagnoses(primary or secondary)? COVID-19   COVID-19 underlying condition? None   Call Number Call 1   Week 1 Call successful? Yes   Call start time 1201   Call end time 1203   Discharge diagnosis Acute hypoxic respiratory failure secondary to COVID 19   Meds reviewed with patient/caregiver? Yes   Is the patient having any side effects they believe may be caused by any medication additions or changes? No   Does the patient have all medications ordered at discharge? Yes   Is the patient taking all medications as directed (includes completed medication regime)? Yes   Does the patient have a primary care provider?  Yes   Does the patient have an appointment with their PCP or specialist within 7 days of discharge? No   What is preventing the patient from scheduling follow up appointments within 7 days of discharge? Haven't had time   Nursing Interventions Educated patient on importance of making appointment, Advised patient to make appointment   Has the patient kept scheduled appointments due by today? N/A   Has home health visited the patient within 72 hours of discharge? N/A   Psychosocial issues? No   Did the patient receive a copy of their discharge instructions? Yes   Did the patient receive a copy of COVID-19 specific instructions? Yes   Nursing interventions Reviewed instructions with patient   What is the patient's perception of their health status since discharge? Improving   Does the patient have any of the following symptoms? None   Nursing Interventions Nurse provided patient education   Pulse Ox monitoring None   Is the patient/caregiver able to teach back steps to recovery at home? Set small, achievable goals for return to baseline health, Rest and rebuild strength, gradually increase activity, Make a list of questions for provider's appointment    If the patient is a current smoker, are they able to teach back resources for cessation? Not a smoker   Is the patient/caregiver able to teach back the hierarchy of who to call/visit for symptoms/problems? PCP, Specialist, Home health nurse, Urgent Care, ED, 911 Yes   COVID-19 call completed? Yes   Wrap up additional comments Patient reports she feels much better. has no symptoms at all.           CRISS GLASS - Registered Nurse

## 2022-08-20 LAB
BACTERIA SPEC AEROBE CULT: NORMAL
BACTERIA SPEC AEROBE CULT: NORMAL

## 2022-08-21 ENCOUNTER — READMISSION MANAGEMENT (OUTPATIENT)
Dept: CALL CENTER | Facility: HOSPITAL | Age: 52
End: 2022-08-21

## 2022-08-21 NOTE — OUTREACH NOTE
COVID-19 Week 1 Survey    Flowsheet Row Responses   [facility] patient discharged from? [facility]   Does the patient have one of the following disease processes/diagnoses(primary or secondary)? COVID-19   COVID-19 underlying condition? None   Call Number Call 2   Week 1 Call successful? Yes   Call start time 1517   Call end time 1518   Discharge diagnosis Acute hypoxic respiratory failure secondary to COVID 19   Does the patient have a primary care provider?  Yes   Comments regarding PCP PCP apt on 8/29/22   Has the patient kept scheduled appointments due by today? N/A   What is the patient's perception of their health status since discharge? Improving   Does the patient have any of the following symptoms? None   Pulse Ox monitoring Intermittent   Pulse Ox device source Patient   O2 Sat comments 96%   O2 Sat: education provided Sat levels, Monitoring frequency, When to seek care   Is the patient/caregiver able to teach back steps to recovery at home? Set small, achievable goals for return to baseline health, Rest and rebuild strength, gradually increase activity   Is the patient/caregiver able to teach back the hierarchy of who to call/visit for symptoms/problems? PCP, Specialist, Home health nurse, Urgent Care, ED, 911 Yes   COVID-19 call completed? Yes   Revoked No further contact(revokes)-requires comment   Is the patient interested in additional calls from an ambulatory ?  NOTE:  applies to high risk patients requiring additional follow-up. No   Graduated/Revoked comments Patient is doing much better-no issues    Wrap up additional comments Patient reports she feels much better- no issues           HYACINTH H - Registered Nurse